# Patient Record
Sex: MALE | Race: WHITE | NOT HISPANIC OR LATINO | Employment: FULL TIME | URBAN - METROPOLITAN AREA
[De-identification: names, ages, dates, MRNs, and addresses within clinical notes are randomized per-mention and may not be internally consistent; named-entity substitution may affect disease eponyms.]

---

## 2024-01-23 ENCOUNTER — OFFICE VISIT (OUTPATIENT)
Dept: INTERNAL MEDICINE | Facility: CLINIC | Age: 59
End: 2024-01-23
Payer: COMMERCIAL

## 2024-01-23 VITALS
HEIGHT: 70 IN | TEMPERATURE: 98 F | BODY MASS INDEX: 39.4 KG/M2 | OXYGEN SATURATION: 98 % | HEART RATE: 79 BPM | WEIGHT: 275.2 LBS | DIASTOLIC BLOOD PRESSURE: 71 MMHG | SYSTOLIC BLOOD PRESSURE: 121 MMHG

## 2024-01-23 DIAGNOSIS — J45.20 MILD INTERMITTENT ASTHMA WITHOUT COMPLICATION: ICD-10-CM

## 2024-01-23 DIAGNOSIS — Z00.00 WELL ADULT EXAM: ICD-10-CM

## 2024-01-23 DIAGNOSIS — I25.10 CORONARY ARTERY DISEASE INVOLVING NATIVE CORONARY ARTERY OF NATIVE HEART WITHOUT ANGINA PECTORIS: ICD-10-CM

## 2024-01-23 DIAGNOSIS — I10 PRIMARY HYPERTENSION: ICD-10-CM

## 2024-01-23 DIAGNOSIS — G89.29 CHRONIC LEFT SHOULDER PAIN: ICD-10-CM

## 2024-01-23 DIAGNOSIS — K21.9 GASTROESOPHAGEAL REFLUX DISEASE WITHOUT ESOPHAGITIS: ICD-10-CM

## 2024-01-23 DIAGNOSIS — M25.512 CHRONIC LEFT SHOULDER PAIN: ICD-10-CM

## 2024-01-23 DIAGNOSIS — E78.2 MIXED HYPERLIPIDEMIA: ICD-10-CM

## 2024-01-23 DIAGNOSIS — Z12.5 PROSTATE CANCER SCREENING: ICD-10-CM

## 2024-01-23 DIAGNOSIS — E11.9 TYPE 2 DIABETES MELLITUS WITHOUT COMPLICATION, WITHOUT LONG-TERM CURRENT USE OF INSULIN: Primary | ICD-10-CM

## 2024-01-23 PROBLEM — E66.9 OBESITY WITH BODY MASS INDEX 30 OR GREATER: Status: ACTIVE | Noted: 2019-08-17

## 2024-01-23 PROBLEM — R17 HIGH TOTAL BILIRUBIN: Status: ACTIVE | Noted: 2019-09-19

## 2024-01-23 PROBLEM — J45.909 ASTHMA: Status: ACTIVE | Noted: 2024-01-23

## 2024-01-23 PROCEDURE — 99204 OFFICE O/P NEW MOD 45 MIN: CPT | Performed by: INTERNAL MEDICINE

## 2024-01-23 RX ORDER — LISINOPRIL 10 MG/1
10 TABLET ORAL DAILY
Qty: 90 TABLET | Refills: 1 | Status: SHIPPED | OUTPATIENT
Start: 2024-01-23

## 2024-01-23 RX ORDER — GLIMEPIRIDE 4 MG/1
4 TABLET ORAL
Qty: 90 TABLET | Refills: 1 | Status: SHIPPED | OUTPATIENT
Start: 2024-01-23

## 2024-01-23 RX ORDER — LANSOPRAZOLE 30 MG/1
30 CAPSULE, DELAYED RELEASE ORAL DAILY
Qty: 90 CAPSULE | Refills: 1 | Status: SHIPPED | OUTPATIENT
Start: 2024-01-23

## 2024-01-23 RX ORDER — GLIMEPIRIDE 4 MG/1
4 TABLET ORAL
Status: SHIPPED | COMMUNITY
Start: 2024-01-23 | End: 2024-01-23 | Stop reason: SDUPTHER

## 2024-01-23 RX ORDER — SILDENAFIL 100 MG/1
1 TABLET, FILM COATED ORAL DAILY PRN
COMMUNITY

## 2024-01-23 RX ORDER — ROSUVASTATIN CALCIUM 40 MG/1
40 TABLET, COATED ORAL DAILY
Qty: 90 TABLET | Refills: 1 | Status: SHIPPED | OUTPATIENT
Start: 2024-01-23

## 2024-01-23 RX ORDER — EMPAGLIFLOZIN 25 MG/1
25 TABLET, FILM COATED ORAL DAILY
Qty: 90 TABLET | Refills: 1 | Status: SHIPPED | OUTPATIENT
Start: 2024-01-23

## 2024-01-23 RX ORDER — DULAGLUTIDE 0.75 MG/.5ML
0.5 INJECTION, SOLUTION SUBCUTANEOUS
COMMUNITY
End: 2024-01-23

## 2024-01-23 RX ORDER — ALBUTEROL SULFATE 90 UG/1
1 AEROSOL, METERED RESPIRATORY (INHALATION)
COMMUNITY

## 2024-01-23 RX ORDER — METOPROLOL SUCCINATE 25 MG/1
25 TABLET, EXTENDED RELEASE ORAL DAILY
Qty: 90 TABLET | Refills: 1 | Status: SHIPPED | OUTPATIENT
Start: 2024-01-23

## 2024-01-23 RX ORDER — MECOBALAMIN 5000 MCG
15 TABLET,DISINTEGRATING ORAL
COMMUNITY
End: 2024-01-23

## 2024-01-23 RX ORDER — ROSUVASTATIN CALCIUM 10 MG/1
1 TABLET, COATED ORAL EVERY EVENING
COMMUNITY
End: 2024-01-23 | Stop reason: SDUPTHER

## 2024-01-23 NOTE — ASSESSMENT & PLAN NOTE
Blood pressure is well-controlled and his pulse is in the upper 70s as of his 1/24 OV.  Patient stable to continue with low doses of lisinopril and metoprolol.

## 2024-01-23 NOTE — PROGRESS NOTES
"Chief Complaint  Establish Care (Pt here to establish care. Pt wants to discuss left shoulder pain)    Subjective      Cisco Acevedo presents to [facility]    History of Present Illness  Patient is a 58-year-old male with underlying DM, HTN, HLD, resultant CAD, morbid obesity, among others, who is being seen in 1/24 as a New Patient.  We will review his med list, go over any recent labs available, address his care gaps, and make further recommendations at that time.    Review of Systems   Constitutional:  Negative for appetite change, fatigue and fever.   HENT:  Negative for congestion and ear pain.    Eyes:  Negative for blurred vision.   Respiratory:  Negative for cough, chest tightness, shortness of breath and wheezing.    Cardiovascular:  Negative for chest pain, palpitations and leg swelling.   Gastrointestinal:  Negative for abdominal pain.   Genitourinary:  Negative for difficulty urinating, dysuria and hematuria.   Musculoskeletal:  Negative for arthralgias and gait problem.   Skin:  Negative for skin lesions.   Neurological:  Negative for syncope, memory problem and confusion.   Psychiatric/Behavioral:  Negative for self-injury and depressed mood.        Objective   Vital Signs:   /71   Pulse 79   Temp 98 °F (36.7 °C)   Ht 177.8 cm (70\")   Wt 125 kg (275 lb 3.2 oz)   SpO2 98%   BMI 39.49 kg/m²         Physical Exam  Vitals and nursing note reviewed.   Constitutional:       General: He is not in acute distress.     Appearance: Normal appearance. He is not toxic-appearing.   HENT:      Head: Atraumatic.      Right Ear: External ear normal.      Left Ear: External ear normal.      Nose: Nose normal.      Mouth/Throat:      Mouth: Mucous membranes are moist.   Eyes:      General:         Right eye: No discharge.         Left eye: No discharge.      Extraocular Movements: Extraocular movements intact.      Pupils: Pupils are equal, round, and reactive to light. "   Neck:      Comments: No carotid bruit.  Cardiovascular:      Rate and Rhythm: Normal rate and regular rhythm.      Pulses: Normal pulses.      Heart sounds: Normal heart sounds. No murmur heard.     No gallop.      Comments: Heart tones normal, no ectopy, no S3.  Pulmonary:      Effort: Pulmonary effort is normal. No respiratory distress.      Breath sounds: No wheezing, rhonchi or rales.      Comments: Lung fields clear bilaterally.  Abdominal:      General: There is no distension.      Palpations: Abdomen is soft. There is no mass.      Tenderness: There is no abdominal tenderness. There is no guarding.      Comments: Positive liver edge.   Musculoskeletal:         General: No swelling or tenderness.      Cervical back: No tenderness.      Right lower leg: No edema.      Left lower leg: No edema.      Comments: Trace pretibial edema.   Skin:     General: Skin is warm and dry.      Findings: No rash.   Neurological:      General: No focal deficit present.      Mental Status: He is alert and oriented to person, place, and time. Mental status is at baseline.      Motor: No weakness.      Gait: Gait normal.   Psychiatric:         Mood and Affect: Mood normal.         Thought Content: Thought content normal.          Result Review   The following data was reviewed by: Bakari Coppola MD on 01/23/2024:  [x] Laboratory  [] Microbiology  [] Radiology  [] EKG/telemetry  [] Cardiology/Vascular  [] Pathology  [x] Old records             Assessment and Plan   Diagnoses and all orders for this visit:    1. Type 2 diabetes mellitus without complication, without long-term current use of insulin (Primary)  Overview:  Patient diagnosed as diabetic at least in his mid 30s, was initially on insulin, and transition to oral agents.    Assessment & Plan:  Last A1c in the chart was 9.1 that was back in early 2023.  Sounds like he had an A1c of about 8.2 since then.    Patient is currently on full doses of metformin and Jardiance.  He is  on moderate dose of glimepiride.    Of note, he failed semaglutide, episode of pancreatitis.    Recommend patient continue current regimen, will get A1c and make further recommendations after that.        Orders:  -     C-Peptide; Future  -     Microalbumin / Creatinine Urine Ratio - Urine, Clean Catch; Future  -     Hemoglobin A1c; Future    2. Mixed hyperlipidemia  -     Lipid Panel; Future    3. Primary hypertension  Assessment & Plan:  Blood pressure is well-controlled and his pulse is in the upper 70s as of his 1/24 OV.  Patient stable to continue with low doses of lisinopril and metoprolol.    Orders:  -     Comprehensive Metabolic Panel; Future  -     Urinalysis With Culture If Indicated -; Future    4. Gastroesophageal reflux disease without esophagitis  Assessment & Plan:  Patient had a early ulcer, but mainly maintained on PPI for reflux presently.  Patient with no dysphagia and no significant dyspepsia on moderate dose of lansoprazole, so stable to continue same.      5. Coronary artery disease involving native coronary artery of native heart without angina pectoris  Overview:  CTA coronaries 7/21:  1. Abnormal coronary arteries within limitations of CT technique.   2. 50-70% stenosis of the proximal RCA and 25-50% stenosis of the LAD.    Uninterpretable stenosis of the OM and mid RCA.   3. Calcification in the ascending aorta.    4. Normal pericardium and cardiac morphology within limitations of CT   technique.     Assessment & Plan:  Patient is clinically stable this regards as of 1/24.  He was hospitalized back in 2021 and given his risk factors, he underwent evaluation for possible angina as noted above.  He had noncritical lesions, recommendation was for conservative treatment at that time.    Patient has been maintained on baby aspirin, beta-blocker, and statin therapy since then.    Will make at least a one-time referral over to cardiology to see if they recommend follow-up CTA of his coronaries or  other imaging studies.  Otherwise patient will continue current plan of care.    Orders:  -     Ambulatory Referral to Cardiology    6. Well adult exam  -     CBC & Differential; Future  -     TSH+Free T4; Future  -     Hepatitis C Antibody; Future    7. Prostate cancer screening  -     PSA Screen; Future  -     Vitamin D,25-Hydroxy; Future    8. Mild intermittent asthma without complication  Assessment & Plan:  This was more of an issue when he was younger, mainly just flareups if he has an acute respiratory illness.  He has an albuterol inhaler available for rescue use only.  Certainly appropriate to continue current plan of care.      9. Chronic left shoulder pain  Assessment & Plan:  This is been going on for least 6 months as of his 1/24 OV.  There was no specific trauma.  The pain is gradually improving, but he does have decreased ROM still.    Patient has decreased ROM on exam.  I think we will make referral to PT first, if not improving as expected, will ask orthopedics to eval.    Orders:  -     Ambulatory Referral to Physical Therapy Evaluate and treat    Other orders  -     metFORMIN (GLUCOPHAGE) 1000 MG tablet; Take 1 tablet by mouth Every 12 (Twelve) Hours.  Dispense: 360 tablet; Refill: 1  -     Jardiance 25 MG tablet tablet; Take 1 tablet by mouth Daily.  Dispense: 90 tablet; Refill: 1  -     glimepiride (AMARYL) 4 MG tablet; Take 1 tablet by mouth Every Morning Before Breakfast.  Dispense: 90 tablet; Refill: 1  -     rosuvastatin (CRESTOR) 40 MG tablet; Take 1 tablet by mouth Daily.  Dispense: 90 tablet; Refill: 1  -     lisinopril (PRINIVIL,ZESTRIL) 10 MG tablet; Take 1 tablet by mouth Daily.  Dispense: 90 tablet; Refill: 1  -     metoprolol succinate XL (TOPROL-XL) 25 MG 24 hr tablet; Take 1 tablet by mouth Daily.  Dispense: 90 tablet; Refill: 1  -     lansoprazole (PREVACID) 30 MG capsule; Take 1 capsule by mouth Daily.  Dispense: 90 capsule; Refill: 1        Class 2 Severe Obesity (BMI >=35 and  <=39.9). Obesity-related health conditions include the following: coronary heart disease, diabetes mellitus, dyslipidemias, and osteoarthritis. Obesity is unchanged. BMI is is above average; BMI management plan is completed. We discussed portion control and increasing exercise.    Follow Up   Return in about 4 weeks (around 2/20/2024).  Patient was given instructions and counseling regarding his condition or for health maintenance advice. Please see specific information pulled into the AVS if appropriate.     Total Time Spent:   minutes     This time includes time spent by me in the following activities: preparing for the visit, reviewing extensive past medical history and tests, performing a medically appropriate examination and/or evaluation, counseling and educating the patient and/or caregivers, ordering medications, tests, or procedures, referring and/or communicating with other health care professionals and documenting information in the medical record all on this date of service.

## 2024-01-23 NOTE — ASSESSMENT & PLAN NOTE
This was more of an issue when he was younger, mainly just flareups if he has an acute respiratory illness.  He has an albuterol inhaler available for rescue use only.  Certainly appropriate to continue current plan of care.

## 2024-01-23 NOTE — ASSESSMENT & PLAN NOTE
This is been going on for least 6 months as of his 1/24 OV.  There was no specific trauma.  The pain is gradually improving, but he does have decreased ROM still.    Patient has decreased ROM on exam.  I think we will make referral to PT first, if not improving as expected, will ask orthopedics to eval.

## 2024-01-23 NOTE — ASSESSMENT & PLAN NOTE
Last A1c in the chart was 9.1 that was back in early 2023.  Sounds like he had an A1c of about 8.2 since then.    Patient is currently on full doses of metformin and Jardiance.  He is on moderate dose of glimepiride.    Of note, he failed semaglutide, episode of pancreatitis.    Recommend patient continue current regimen, will get A1c and make further recommendations after that.

## 2024-01-23 NOTE — ASSESSMENT & PLAN NOTE
Patient had a early ulcer, but mainly maintained on PPI for reflux presently.  Patient with no dysphagia and no significant dyspepsia on moderate dose of lansoprazole, so stable to continue same.

## 2024-01-23 NOTE — ASSESSMENT & PLAN NOTE
Patient is clinically stable this regards as of 1/24.  He was hospitalized back in 2021 and given his risk factors, he underwent evaluation for possible angina as noted above.  He had noncritical lesions, recommendation was for conservative treatment at that time.    Patient has been maintained on baby aspirin, beta-blocker, and statin therapy since then.    Will make at least a one-time referral over to cardiology to see if they recommend follow-up CTA of his coronaries or other imaging studies.  Otherwise patient will continue current plan of care.

## 2024-01-26 ENCOUNTER — PATIENT ROUNDING (BHMG ONLY) (OUTPATIENT)
Dept: INTERNAL MEDICINE | Facility: CLINIC | Age: 59
End: 2024-01-26
Payer: COMMERCIAL

## 2024-03-02 ENCOUNTER — LAB (OUTPATIENT)
Dept: LAB | Facility: HOSPITAL | Age: 59
End: 2024-03-02
Payer: COMMERCIAL

## 2024-03-02 DIAGNOSIS — I10 PRIMARY HYPERTENSION: ICD-10-CM

## 2024-03-02 DIAGNOSIS — Z00.00 WELL ADULT EXAM: ICD-10-CM

## 2024-03-02 DIAGNOSIS — E11.9 TYPE 2 DIABETES MELLITUS WITHOUT COMPLICATION, WITHOUT LONG-TERM CURRENT USE OF INSULIN: ICD-10-CM

## 2024-03-02 DIAGNOSIS — E78.2 MIXED HYPERLIPIDEMIA: ICD-10-CM

## 2024-03-02 DIAGNOSIS — Z12.5 PROSTATE CANCER SCREENING: ICD-10-CM

## 2024-03-02 LAB
25(OH)D3 SERPL-MCNC: 21.9 NG/ML (ref 30–100)
ALBUMIN SERPL-MCNC: 4.3 G/DL (ref 3.5–5.2)
ALBUMIN UR-MCNC: 20.8 MG/DL
ALBUMIN/GLOB SERPL: 1.3 G/DL
ALP SERPL-CCNC: 125 U/L (ref 39–117)
ALT SERPL W P-5'-P-CCNC: 23 U/L (ref 1–41)
ANION GAP SERPL CALCULATED.3IONS-SCNC: 13.7 MMOL/L (ref 5–15)
AST SERPL-CCNC: 20 U/L (ref 1–40)
BACTERIA UR QL AUTO: NORMAL /HPF
BASOPHILS # BLD AUTO: 0.04 10*3/MM3 (ref 0–0.2)
BASOPHILS NFR BLD AUTO: 0.7 % (ref 0–1.5)
BILIRUB SERPL-MCNC: 1.1 MG/DL (ref 0–1.2)
BILIRUB UR QL STRIP: NEGATIVE
BUN SERPL-MCNC: 26 MG/DL (ref 6–20)
BUN/CREAT SERPL: 17.8 (ref 7–25)
CALCIUM SPEC-SCNC: 10 MG/DL (ref 8.6–10.5)
CHLORIDE SERPL-SCNC: 103 MMOL/L (ref 98–107)
CHOLEST SERPL-MCNC: 262 MG/DL (ref 0–200)
CLARITY UR: CLEAR
CO2 SERPL-SCNC: 25.3 MMOL/L (ref 22–29)
COLOR UR: YELLOW
CREAT SERPL-MCNC: 1.46 MG/DL (ref 0.76–1.27)
CREAT UR-MCNC: 35.2 MG/DL
DEPRECATED RDW RBC AUTO: 38.2 FL (ref 37–54)
EGFRCR SERPLBLD CKD-EPI 2021: 55.7 ML/MIN/1.73
EOSINOPHIL # BLD AUTO: 0.15 10*3/MM3 (ref 0–0.4)
EOSINOPHIL NFR BLD AUTO: 2.6 % (ref 0.3–6.2)
ERYTHROCYTE [DISTWIDTH] IN BLOOD BY AUTOMATED COUNT: 12.5 % (ref 12.3–15.4)
GLOBULIN UR ELPH-MCNC: 3.2 GM/DL
GLUCOSE SERPL-MCNC: 313 MG/DL (ref 65–99)
GLUCOSE UR STRIP-MCNC: ABNORMAL MG/DL
HBA1C MFR BLD: 11.9 % (ref 4.8–5.6)
HCT VFR BLD AUTO: 52.7 % (ref 37.5–51)
HCV AB SER DONR QL: NORMAL
HDLC SERPL-MCNC: 47 MG/DL (ref 40–60)
HGB BLD-MCNC: 17.2 G/DL (ref 13–17.7)
HGB UR QL STRIP.AUTO: NEGATIVE
HOLD SPECIMEN: NORMAL
HYALINE CASTS UR QL AUTO: NORMAL /LPF
IMM GRANULOCYTES # BLD AUTO: 0.03 10*3/MM3 (ref 0–0.05)
IMM GRANULOCYTES NFR BLD AUTO: 0.5 % (ref 0–0.5)
KETONES UR QL STRIP: ABNORMAL
LDLC SERPL CALC-MCNC: 168 MG/DL (ref 0–100)
LDLC/HDLC SERPL: 3.51 {RATIO}
LEUKOCYTE ESTERASE UR QL STRIP.AUTO: NEGATIVE
LYMPHOCYTES # BLD AUTO: 1.58 10*3/MM3 (ref 0.7–3.1)
LYMPHOCYTES NFR BLD AUTO: 27.9 % (ref 19.6–45.3)
MCH RBC QN AUTO: 27.8 PG (ref 26.6–33)
MCHC RBC AUTO-ENTMCNC: 32.6 G/DL (ref 31.5–35.7)
MCV RBC AUTO: 85.3 FL (ref 79–97)
MICROALBUMIN/CREAT UR: 590.9 MG/G (ref 0–29)
MONOCYTES # BLD AUTO: 0.48 10*3/MM3 (ref 0.1–0.9)
MONOCYTES NFR BLD AUTO: 8.5 % (ref 5–12)
NEUTROPHILS NFR BLD AUTO: 3.39 10*3/MM3 (ref 1.7–7)
NEUTROPHILS NFR BLD AUTO: 59.8 % (ref 42.7–76)
NITRITE UR QL STRIP: NEGATIVE
NRBC BLD AUTO-RTO: 0 /100 WBC (ref 0–0.2)
PH UR STRIP.AUTO: 6 [PH] (ref 5–8)
PLATELET # BLD AUTO: 214 10*3/MM3 (ref 140–450)
PMV BLD AUTO: 10 FL (ref 6–12)
POTASSIUM SERPL-SCNC: 5.3 MMOL/L (ref 3.5–5.2)
PROT SERPL-MCNC: 7.5 G/DL (ref 6–8.5)
PROT UR QL STRIP: ABNORMAL
PSA SERPL-MCNC: 1.39 NG/ML (ref 0–4)
RBC # BLD AUTO: 6.18 10*6/MM3 (ref 4.14–5.8)
RBC # UR STRIP: NORMAL /HPF
REF LAB TEST METHOD: NORMAL
SODIUM SERPL-SCNC: 142 MMOL/L (ref 136–145)
SP GR UR STRIP: >=1.03 (ref 1–1.03)
SQUAMOUS #/AREA URNS HPF: NORMAL /HPF
T4 FREE SERPL-MCNC: 1.53 NG/DL (ref 0.93–1.7)
TRIGL SERPL-MCNC: 251 MG/DL (ref 0–150)
TSH SERPL DL<=0.05 MIU/L-ACNC: 1.23 UIU/ML (ref 0.27–4.2)
UROBILINOGEN UR QL STRIP: ABNORMAL
VLDLC SERPL-MCNC: 47 MG/DL (ref 5–40)
WBC # UR STRIP: NORMAL /HPF
WBC NRBC COR # BLD AUTO: 5.67 10*3/MM3 (ref 3.4–10.8)

## 2024-03-02 PROCEDURE — 80050 GENERAL HEALTH PANEL: CPT

## 2024-03-02 PROCEDURE — G0103 PSA SCREENING: HCPCS

## 2024-03-02 PROCEDURE — 86803 HEPATITIS C AB TEST: CPT

## 2024-03-02 PROCEDURE — 83036 HEMOGLOBIN GLYCOSYLATED A1C: CPT

## 2024-03-02 PROCEDURE — 82306 VITAMIN D 25 HYDROXY: CPT

## 2024-03-02 PROCEDURE — 84681 ASSAY OF C-PEPTIDE: CPT

## 2024-03-02 PROCEDURE — 80061 LIPID PANEL: CPT

## 2024-03-02 PROCEDURE — 81001 URINALYSIS AUTO W/SCOPE: CPT

## 2024-03-02 PROCEDURE — 84439 ASSAY OF FREE THYROXINE: CPT

## 2024-03-02 PROCEDURE — 82043 UR ALBUMIN QUANTITATIVE: CPT

## 2024-03-02 PROCEDURE — 82570 ASSAY OF URINE CREATININE: CPT

## 2024-03-02 PROCEDURE — 36415 COLL VENOUS BLD VENIPUNCTURE: CPT

## 2024-03-04 ENCOUNTER — TELEPHONE (OUTPATIENT)
Dept: INTERNAL MEDICINE | Age: 59
End: 2024-03-04
Payer: COMMERCIAL

## 2024-03-04 NOTE — TELEPHONE ENCOUNTER
HUB may really to patient      ----- Message from Bakari Coppola MD sent at 3/4/2024 10:29 AM EST -----  Please let patient know there are numerous abnormalities on his labs and we will review them at his appointment this week.  He should read about a low potassium diet and try to start following this in the meantime.  We will give him a handout at this appointment as well.   Spoke with Jovanna, Advised MD will see as soon as they can come. Jovanna states that they can be here between 12:00pm and 1:00pm. Informed we will see here once she gets here.

## 2024-03-05 ENCOUNTER — TELEPHONE (OUTPATIENT)
Dept: INTERNAL MEDICINE | Age: 59
End: 2024-03-05
Payer: COMMERCIAL

## 2024-03-05 LAB — C PEPTIDE SERPL-MCNC: 2.5 NG/ML (ref 1.1–4.4)

## 2024-03-05 NOTE — TELEPHONE ENCOUNTER
HUB may relay message to patient.     ----- Message from Bakari Coppola MD sent at 3/4/2024 10:29 AM EST -----  Please let patient know there are numerous abnormalities on his labs and we will review them at his appointment this week.  He should read about a low potassium diet and try to start following this in the meantime.  We will give him a handout at this appointment as well.

## 2024-03-07 ENCOUNTER — OFFICE VISIT (OUTPATIENT)
Dept: INTERNAL MEDICINE | Facility: CLINIC | Age: 59
End: 2024-03-07
Payer: COMMERCIAL

## 2024-03-07 VITALS
WEIGHT: 276.6 LBS | SYSTOLIC BLOOD PRESSURE: 118 MMHG | HEIGHT: 70 IN | TEMPERATURE: 97.7 F | HEART RATE: 77 BPM | DIASTOLIC BLOOD PRESSURE: 68 MMHG | BODY MASS INDEX: 39.6 KG/M2 | OXYGEN SATURATION: 98 %

## 2024-03-07 DIAGNOSIS — E55.9 VITAMIN D DEFICIENCY: ICD-10-CM

## 2024-03-07 DIAGNOSIS — D75.1 POLYCYTHEMIA: ICD-10-CM

## 2024-03-07 DIAGNOSIS — E78.2 MIXED HYPERLIPIDEMIA: ICD-10-CM

## 2024-03-07 DIAGNOSIS — Z00.00 WELL ADULT EXAM: Primary | ICD-10-CM

## 2024-03-07 DIAGNOSIS — E11.9 TYPE 2 DIABETES MELLITUS WITHOUT COMPLICATION, WITHOUT LONG-TERM CURRENT USE OF INSULIN: ICD-10-CM

## 2024-03-07 DIAGNOSIS — N18.31 STAGE 3A CHRONIC KIDNEY DISEASE: ICD-10-CM

## 2024-03-07 DIAGNOSIS — I25.10 CORONARY ARTERY DISEASE INVOLVING NATIVE CORONARY ARTERY OF NATIVE HEART WITHOUT ANGINA PECTORIS: ICD-10-CM

## 2024-03-07 DIAGNOSIS — I10 PRIMARY HYPERTENSION: ICD-10-CM

## 2024-03-07 RX ORDER — ASPIRIN 81 MG/1
81 TABLET, COATED ORAL DAILY
Qty: 90 TABLET | Refills: 1 | Status: SHIPPED | OUTPATIENT
Start: 2024-03-07

## 2024-03-07 RX ORDER — VALACYCLOVIR HYDROCHLORIDE 500 MG/1
500 TABLET, FILM COATED ORAL AS NEEDED
Qty: 30 TABLET | Refills: 1 | Status: SHIPPED | OUTPATIENT
Start: 2024-03-07

## 2024-03-07 RX ORDER — VALACYCLOVIR HYDROCHLORIDE 500 MG/1
500 TABLET, FILM COATED ORAL AS NEEDED
COMMUNITY
End: 2024-03-07 | Stop reason: SDUPTHER

## 2024-03-07 NOTE — ASSESSMENT & PLAN NOTE
Patient's GFR is 56, we do not have a previous baseline.  His creatinine is fairly similar to previous though, it is 1.4.  He reports being told it has been like this for 20 years, hopefully will stay steady.  Of note his potassium is high normal at 5.3, he is on no potassium supplementation he is on low-dose ACE inhibitor though.  He does have proteinuria, so we need the ACE.  May be secondary to how the blood was handled, will have labs sent to the hospital next time.

## 2024-03-07 NOTE — ASSESSMENT & PLAN NOTE
Patient stable this regards at least as of 3/24.  He is on low-dose baby aspirin and metoprolol for antianginal benefit, should continue same.  We are working on getting him back on his cholesterol to get his LDL down.    Still has not heard from cardiology about the referral, we are looking into this again.

## 2024-03-07 NOTE — ASSESSMENT & PLAN NOTE
LDL is 168 as of his 3/24 OV.  He has full dose Crestor written for, but has been intolerant to this due to cramps/myalgias.  He will get on coenzyme every 10 give this some time to build up, and then see if he can get back on Crestor.  Discussed with patient should at least try to take it every other day.

## 2024-03-07 NOTE — ASSESSMENT & PLAN NOTE
This is mild, hemoglobin is around 17 hematocrit was little bit elevated as well.  We did not get iron studies, will check those on return to office.  He is a non-smoker.

## 2024-03-07 NOTE — PATIENT INSTRUCTIONS
1.  Look for vitamin D3 over-the-counter, you need to take 2000 international units once daily.    2.  Also look for coenzyme Q10.  You should take 200 mg once a day.  Get on this, take it for 3 weeks or so, then get started back on your cholesterol medication.    3.  Until we see follow-up labs, may be look at some of the potassium content of the food as noted below.    4.  We will be sending over supplies for testing, and as we said, we want to eventually get your morning sugars into the 150 ballpark.  You can titrate the insulin as needed by 2 to 4 units every several days.  Call if we need to change the prescription.        Potassium Content of Foods    Potassium is a mineral found in many foods and drinks. It can affect how the heart works, affect blood pressure, and keep fluids and electrolytes balanced in the body. It is important not to have too much potassium (hyperkalemia) or too little potassium (hypokalemia) in the body, especially in the blood.  Potassium is naturally found in many different types of whole foods, such as fruits, vegetables, meat, and dairy products. Processed foods tend to be lower in potassium. The amount of potassium you need each day depends on your age and any medical conditions you may have. General recommendations are:  Females aged 19 and older: 2,600 mg per day.  Males aged 19 and older: 3,400 mg per day.  Talk with your health care provider or dietitian about how much potassium you need.  What foods are high in potassium?  Below are examples of foods that have greater than 200 mg of potassium per serving.  Fruits  Orange -- 1 medium (130 g) has 230 mg of potassium.  Banana -- 1 medium (120 g) has 420 mg of potassium.  Cantaloupe, chunks -- 1 cup (160 g) has 430 mg of potassium.  Vegetables  Potato, baked, without skin -- 1 medium (170 g) has 600 mg of potassium.  Broccoli, chopped, cooked -- ½ cup (77.5 g) has 230 mg of potassium.  Tomato, chopped or sliced -- 1 cup (152 g) has  400 mg of potassium.  Grains  Cereal, bran with raisins -- 1 cup (59 g) has 360 mg of potassium.  Granola with almonds -- ½ cup (82 g) has 220 mg of potassium.  Meats and other proteins  Ground beef guillermo -- 4 ounces (113 g) has 240 mg of potassium.  Kidney beans, boiled -- ½ cup (130 g) has 350 mg of potassium.  Almonds -- 1 ounce (approximately 22 nuts or 28 g) has 200 mg of potassium.  Dairy  Cow's milk, 1% -- 1 cup (237 mL) has 360 mg of potassium.  Plain vanilla low-fat yogurt -- ¾ cup (184 g) has 220 mg of potassium.  The items listed above may not be a complete list of foods high in potassium. Actual amounts of potassium may be different depending on ripeness, shelf life, and food preparation. Contact a dietitian for more information.  What foods are low in potassium?  Below are examples of foods that have less than 200 mg of potassium per serving.  Fruits  Blueberries -- 1 cup (145 g) has 110 mg of potassium.  Apple -- 1 medium (140 g) has 145 mg of potassium.  Grapes -- 1 cup (160 g) has 175 mg of potassium.  Vegetables  Cabbage, raw -- 1 cup (70 g) has 120 mg of potassium.  Cauliflower, chopped, cooked -- 1 cup (180 g) has 90 mg of potassium.  Jose lettuce, chopped -- 1 cup (56 g) has 120 mg of potassium.  Grains  Bagel, plain -- one 4-inch (10 cm) has 100 mg of potassium.  Whole wheat bread -- 1 slice (26 g) has 70 mg of potassium.  White rice, cooked -- 1 cup (163 g) has 50 mg of potassium.  Meats and other proteins  Tuna, light, canned in water -- 3 ounces (85 g) has 150 mg of potassium.  Egg, fried -- 1 large (50 g) has 60 mg of potassium.  Peanuts --1 ounce (35 nuts or 28 g) has 180 mg of potassium.  Tofu -- ½ cup (252 g) has 150 mg of potassium.  Dairy  Cheese (cheddar, jarrell, mozzarella, or provolone) -- 1 ounce (28 g) has 30 to 40 mg of potassium.  The items listed above may not be a complete list of foods that are low in potassium. Actual amounts of potassium may be different depending on  ripeness, shelf life, and food preparation. Contact a dietitian for more information.  Summary  Potassium is a mineral found in many foods and drinks. It affects how the heart works, affects blood pressure, and keeps fluids and electrolytes balanced in the body.  The amount of potassium you need each day depends on your age and any existing medical conditions you may have.  Your health care provider or dietitian may recommend an amount of potassium that you should have each day.  This information is not intended to replace advice given to you by your health care provider. Make sure you discuss any questions you have with your health care provider.  Document Revised: 09/20/2022 Document Reviewed: 09/01/2022  Elsevier Patient Education © 2023 [employer]

## 2024-03-07 NOTE — ASSESSMENT & PLAN NOTE
Blood pressure joellen well-controlled as of his 3/24 office visit.  He can continue with just low doses of lisinopril and metoprolol.

## 2024-03-07 NOTE — ASSESSMENT & PLAN NOTE
A1c is up from 9.1 last year to 11.9 as of his 3/24 OV.  Patient has not been watching his diet, he is put on 30 pounds over the past year or so.  His fasting sugar was 300, there is no acidosis.    Patient is maxed out on metformin and Jardiance.  He is just on moderate dose glimepiride, we will go ahead and maxed that out at this time and check C-peptide on return to office.  Additionally we will get patient started on 20 units of Lantus, may transition to Januvia on return to office if patient allowed to take this given episode of pancreatitis noted above.

## 2024-03-07 NOTE — PROGRESS NOTES
"Chief Complaint  Follow-up (4wk follow up/Labs recently completed//No questions / concerns voiced at this time) and Med Refill ( - Aspirin/ - valacyclovir/Send to [facility] [place])    Subjective      Cisco Acevedo presents to [facility]    History of Present Illness  Patient is a 58-year-old male with underlying DM, HTN, HLD, resultant CAD, morbid obesity, among others, seen in 1/24 as a New Patient, and who is coming back now 3/24 for initial lab follow-up.  We will review his med list, go over any recent labs available, address his care gaps, and make further recommendations at that time.    Review of Systems   Constitutional:  Negative for appetite change, fatigue and fever.   HENT:  Negative for congestion and ear pain.    Eyes:  Negative for blurred vision.   Respiratory:  Negative for cough, chest tightness, shortness of breath and wheezing.    Cardiovascular:  Negative for chest pain, palpitations and leg swelling.   Gastrointestinal:  Negative for abdominal pain.   Genitourinary:  Negative for difficulty urinating, dysuria and hematuria.   Musculoskeletal:  Negative for arthralgias and gait problem.   Skin:  Negative for skin lesions.   Neurological:  Negative for syncope, memory problem and confusion.   Psychiatric/Behavioral:  Negative for self-injury and depressed mood.        Objective   Vital Signs:   /68 (BP Location: Right arm, Patient Position: Sitting)   Pulse 77   Temp 97.7 °F (36.5 °C) (Temporal)   Ht 177.8 cm (70\")   Wt 125 kg (276 lb 9.6 oz)   SpO2 98%   BMI 39.69 kg/m²         Physical Exam  Vitals and nursing note reviewed.   Constitutional:       General: He is not in acute distress.     Appearance: Normal appearance. He is not toxic-appearing.   HENT:      Head: Atraumatic.      Right Ear: External ear normal.      Left Ear: External ear normal.      Nose: Nose normal.      Mouth/Throat:      Mouth: Mucous membranes are moist.   Eyes: "      General:         Right eye: No discharge.         Left eye: No discharge.      Extraocular Movements: Extraocular movements intact.      Pupils: Pupils are equal, round, and reactive to light.   Neck:      Comments: No carotid bruit.  Cardiovascular:      Rate and Rhythm: Normal rate and regular rhythm.      Pulses: Normal pulses.      Heart sounds: Normal heart sounds. No murmur heard.     No gallop.      Comments: Heart tones normal, no ectopy, no S3.  Pulmonary:      Effort: Pulmonary effort is normal. No respiratory distress.      Breath sounds: No wheezing, rhonchi or rales.      Comments: Lung fields clear bilaterally.  Abdominal:      General: There is no distension.      Palpations: Abdomen is soft. There is no mass.      Tenderness: There is no abdominal tenderness. There is no guarding.      Comments: Positive liver edge.   Musculoskeletal:         General: No swelling or tenderness.      Cervical back: No tenderness.      Right lower leg: No edema.      Left lower leg: No edema.      Comments: Trace pretibial edema.   Skin:     General: Skin is warm and dry.      Findings: No rash.   Neurological:      General: No focal deficit present.      Mental Status: He is alert and oriented to person, place, and time. Mental status is at baseline.      Motor: No weakness.      Gait: Gait normal.   Psychiatric:         Mood and Affect: Mood normal.         Thought Content: Thought content normal.          Result Review   The following data was reviewed by: Bakari Coppola MD on 01/23/2024:  [x] Laboratory  [] Microbiology  [] Radiology  [] EKG/telemetry  [] Cardiology/Vascular  [] Pathology  [x] Old records             Assessment and Plan   Diagnoses and all orders for this visit:    1. Well adult exam (Primary)  Overview:  Preventive measures: were reviewed with the patient at this office visit.  They included but were not limited to discussions in regards to vaccines outstanding, auto safety with seat belts and  other assistive devices, fall prevention, and routine screening studies.    Exercise: No ischemic symptoms with routine daily activity.  Comprehensive labs: Reviewing all from 3/24.    Covid vaccine: Will get next year.  Other vaccines: Will get next year.    PSA: 1.4 (3/2/2024) (no known prostate cancer)  Colon: Long history of polyps, getting scopes every 5 years.    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  FH: adopted      2. Primary hypertension  Assessment & Plan:  Blood pressure joellen well-controlled as of his 3/24 office visit.  He can continue with just low doses of lisinopril and metoprolol.      3. Type 2 diabetes mellitus without complication, without long-term current use of insulin  Overview:  Patient diagnosed as diabetic at least in his mid 30s, was initially on insulin, and transition to oral agents.    Patient failed semaglutide secondary to episode of pancreatitis.    Assessment & Plan:  A1c is up from 9.1 last year to 11.9 as of his 3/24 OV.  Patient has not been watching his diet, he is put on 30 pounds over the past year or so.  His fasting sugar was 300, there is no acidosis.    Patient is maxed out on metformin and Jardiance.  He is just on moderate dose glimepiride, we will go ahead and maxed that out at this time and check C-peptide on return to office.  Additionally we will get patient started on 20 units of Lantus, may transition to Januvia on return to office if patient allowed to take this given episode of pancreatitis noted above.    Orders:  -     Hemoglobin A1c; Future  -     C-Peptide; Future    4. Mixed hyperlipidemia  Assessment & Plan:  LDL is 168 as of his 3/24 OV.  He has full dose Crestor written for, but has been intolerant to this due to cramps/myalgias.  He will get on coenzyme every 10 give this some time to build up, and then see if he can get back on Crestor.  Discussed with patient should at least  try to take it every other day.    Orders:  -     Lipid Panel; Future    5. Polycythemia  Assessment & Plan:  This is mild, hemoglobin is around 17 hematocrit was little bit elevated as well.  We did not get iron studies, will check those on return to office.  He is a non-smoker.    Orders:  -     CBC & Differential; Future  -     Iron Profile; Future  -     Ferritin; Future    6. Vitamin D deficiency  Assessment & Plan:  Vitamin D is low at 22 as of 3/24.  Recommend he get on 2000 IUs OTC.      7. Coronary artery disease involving native coronary artery of native heart without angina pectoris  Overview:  CTA coronaries 7/21:  1. Abnormal coronary arteries within limitations of CT technique.   2. 50-70% stenosis of the proximal RCA and 25-50% stenosis of the LAD.    Uninterpretable stenosis of the OM and mid RCA.   3. Calcification in the ascending aorta.    4. Normal pericardium and cardiac morphology within limitations of CT   technique.     Assessment & Plan:  Patient stable this regards at least as of 3/24.  He is on low-dose baby aspirin and metoprolol for antianginal benefit, should continue same.  We are working on getting him back on his cholesterol to get his LDL down.    Still has not heard from cardiology about the referral, we are looking into this again.      8. Stage 3a chronic kidney disease  Assessment & Plan:  Patient's GFR is 56, we do not have a previous baseline.  His creatinine is fairly similar to previous though, it is 1.4.  He reports being told it has been like this for 20 years, hopefully will stay steady.  Of note his potassium is high normal at 5.3, he is on no potassium supplementation he is on low-dose ACE inhibitor though.  He does have proteinuria, so we need the ACE.  May be secondary to how the blood was handled, will have labs sent to the hospital next time.    Orders:  -     Comprehensive Metabolic Panel; Future    Other orders  -     Aspirin Low Dose 81 MG EC tablet; Take 1  tablet by mouth Daily.  Dispense: 90 tablet; Refill: 1  -     valACYclovir (VALTREX) 500 MG tablet; Take 1 tablet by mouth As Needed (frequent eye infections).  Dispense: 30 tablet; Refill: 1  -     Insulin Glargine (LANTUS SOLOSTAR) 100 UNIT/ML injection pen; Inject 20 Units under the skin into the appropriate area as directed Every Night for 180 days.  Dispense: 18 mL; Refill: 1          Class 2 Severe Obesity (BMI >=35 and <=39.9). Obesity-related health conditions include the following: coronary heart disease, diabetes mellitus, dyslipidemias, and osteoarthritis. Obesity is unchanged. BMI is is above average; BMI management plan is completed. We discussed portion control and increasing exercise.    Follow Up   Return in about 3 months (around 6/7/2024).  Patient was given instructions and counseling regarding his condition or for health maintenance advice. Please see specific information pulled into the AVS if appropriate.     Total Time Spent:   minutes     This time includes time spent by me in the following activities: preparing for the visit, reviewing extensive past medical history and tests, performing a medically appropriate examination and/or evaluation, counseling and educating the patient and/or caregivers, ordering medications, tests, or procedures, referring and/or communicating with other health care professionals and documenting information in the medical record all on this date of service.

## 2024-03-11 RX ORDER — LANCETS 30 GAUGE
1 EACH MISCELLANEOUS DAILY
Qty: 100 EACH | Refills: 1 | Status: SHIPPED | OUTPATIENT
Start: 2024-03-11

## 2024-03-11 RX ORDER — BLOOD-GLUCOSE METER
1 KIT MISCELLANEOUS DAILY
Qty: 1 EACH | Refills: 0 | Status: SHIPPED | OUTPATIENT
Start: 2024-03-11

## 2024-03-11 RX ORDER — PEN NEEDLE, DIABETIC 31 G X1/4"
1 NEEDLE, DISPOSABLE MISCELLANEOUS DAILY
Qty: 100 EACH | Refills: 1 | Status: SHIPPED | OUTPATIENT
Start: 2024-03-11

## 2024-03-17 ENCOUNTER — HOSPITAL ENCOUNTER (OUTPATIENT)
Facility: HOSPITAL | Age: 59
Setting detail: OBSERVATION
Discharge: HOME | End: 2024-03-18
Attending: EMERGENCY MEDICINE | Admitting: STUDENT IN AN ORGANIZED HEALTH CARE EDUCATION/TRAINING PROGRAM
Payer: COMMERCIAL

## 2024-03-17 ENCOUNTER — APPOINTMENT (OUTPATIENT)
Dept: GENERAL RADIOLOGY | Facility: HOSPITAL | Age: 59
End: 2024-03-17
Payer: COMMERCIAL

## 2024-03-17 ENCOUNTER — APPOINTMENT (OUTPATIENT)
Dept: CARDIOLOGY | Facility: HOSPITAL | Age: 59
End: 2024-03-17
Payer: COMMERCIAL

## 2024-03-17 DIAGNOSIS — R07.89 CHEST DISCOMFORT: Primary | ICD-10-CM

## 2024-03-17 PROBLEM — I25.709 CORONARY ARTERY DISEASE INVOLVING CORONARY BYPASS GRAFT OF NATIVE HEART WITH ANGINA PECTORIS: Status: ACTIVE | Noted: 2021-07-28

## 2024-03-17 PROBLEM — R07.9 CHEST PAIN: Status: ACTIVE | Noted: 2024-03-17

## 2024-03-17 LAB
ALBUMIN SERPL-MCNC: 3.4 G/DL (ref 3.5–5.2)
ALBUMIN SERPL-MCNC: 3.6 G/DL (ref 3.5–5.2)
ALBUMIN/GLOB SERPL: 1.2 G/DL
ALBUMIN/GLOB SERPL: 1.4 G/DL
ALP SERPL-CCNC: 124 U/L (ref 39–117)
ALP SERPL-CCNC: 97 U/L (ref 39–117)
ALT SERPL W P-5'-P-CCNC: 22 U/L (ref 1–41)
ALT SERPL W P-5'-P-CCNC: 23 U/L (ref 1–41)
ANION GAP SERPL CALCULATED.3IONS-SCNC: 10.4 MMOL/L (ref 5–15)
ANION GAP SERPL CALCULATED.3IONS-SCNC: 12.1 MMOL/L (ref 5–15)
AST SERPL-CCNC: 20 U/L (ref 1–40)
AST SERPL-CCNC: 22 U/L (ref 1–40)
BASOPHILS # BLD AUTO: 0.07 10*3/MM3 (ref 0–0.2)
BASOPHILS # BLD AUTO: 0.08 10*3/MM3 (ref 0–0.2)
BASOPHILS NFR BLD AUTO: 1.1 % (ref 0–1.5)
BASOPHILS NFR BLD AUTO: 1.5 % (ref 0–1.5)
BH CV ECHO MEAS - AO ROOT DIAM: 3.7 CM
BH CV ECHO MEAS - EDV(CUBED): 129.6 ML
BH CV ECHO MEAS - EDV(MOD-SP2): 76.3 ML
BH CV ECHO MEAS - EDV(MOD-SP4): 71.8 ML
BH CV ECHO MEAS - EF(MOD-BP): 56.2 %
BH CV ECHO MEAS - EF(MOD-SP2): 53.2 %
BH CV ECHO MEAS - EF(MOD-SP4): 57.8 %
BH CV ECHO MEAS - ESV(CUBED): 25.2 ML
BH CV ECHO MEAS - ESV(MOD-SP2): 35.7 ML
BH CV ECHO MEAS - ESV(MOD-SP4): 30.3 ML
BH CV ECHO MEAS - FS: 42.1 %
BH CV ECHO MEAS - IVS/LVPW: 1.13 CM
BH CV ECHO MEAS - IVSD: 1.01 CM
BH CV ECHO MEAS - LA DIMENSION: 3.9 CM
BH CV ECHO MEAS - LAT PEAK E' VEL: 12.1 CM/SEC
BH CV ECHO MEAS - LV DIASTOLIC VOL/BSA (35-75): 30.1 CM2
BH CV ECHO MEAS - LV MASS(C)D: 174 GRAMS
BH CV ECHO MEAS - LV SYSTOLIC VOL/BSA (12-30): 12.7 CM2
BH CV ECHO MEAS - LVIDD: 5.1 CM
BH CV ECHO MEAS - LVIDS: 2.9 CM
BH CV ECHO MEAS - LVOT AREA: 3.1 CM2
BH CV ECHO MEAS - LVOT DIAM: 2 CM
BH CV ECHO MEAS - LVPWD: 0.9 CM
BH CV ECHO MEAS - MED PEAK E' VEL: 8.5 CM/SEC
BH CV ECHO MEAS - MV A MAX VEL: 62.1 CM/SEC
BH CV ECHO MEAS - MV DEC SLOPE: 545 CM/SEC2
BH CV ECHO MEAS - MV DEC TIME: 0.17 SEC
BH CV ECHO MEAS - MV E MAX VEL: 93.8 CM/SEC
BH CV ECHO MEAS - MV E/A: 1.51
BH CV ECHO MEAS - RVDD: 3 CM
BH CV ECHO MEAS - SI(MOD-SP2): 17 ML/M2
BH CV ECHO MEAS - SI(MOD-SP4): 17.4 ML/M2
BH CV ECHO MEAS - SV(MOD-SP2): 40.6 ML
BH CV ECHO MEAS - SV(MOD-SP4): 41.5 ML
BH CV ECHO MEAS - TAPSE (>1.6): 2.09 CM
BH CV ECHO MEASUREMENTS AVERAGE E/E' RATIO: 9.11
BILIRUB SERPL-MCNC: 0.9 MG/DL (ref 0–1.2)
BILIRUB SERPL-MCNC: 1.1 MG/DL (ref 0–1.2)
BUN SERPL-MCNC: 28 MG/DL (ref 6–20)
BUN SERPL-MCNC: 30 MG/DL (ref 6–20)
BUN/CREAT SERPL: 21.1 (ref 7–25)
BUN/CREAT SERPL: 21.1 (ref 7–25)
CALCIUM SPEC-SCNC: 8.4 MG/DL (ref 8.6–10.5)
CALCIUM SPEC-SCNC: 9.1 MG/DL (ref 8.6–10.5)
CHLORIDE SERPL-SCNC: 103 MMOL/L (ref 98–107)
CHLORIDE SERPL-SCNC: 105 MMOL/L (ref 98–107)
CHOLEST SERPL-MCNC: 103 MG/DL (ref 0–200)
CO2 SERPL-SCNC: 20.9 MMOL/L (ref 22–29)
CO2 SERPL-SCNC: 22.6 MMOL/L (ref 22–29)
CREAT SERPL-MCNC: 1.33 MG/DL (ref 0.76–1.27)
CREAT SERPL-MCNC: 1.42 MG/DL (ref 0.76–1.27)
DEPRECATED RDW RBC AUTO: 38.4 FL (ref 37–54)
DEPRECATED RDW RBC AUTO: 38.8 FL (ref 37–54)
EGFRCR SERPLBLD CKD-EPI 2021: 57.6 ML/MIN/1.73
EGFRCR SERPLBLD CKD-EPI 2021: 62.3 ML/MIN/1.73
EOSINOPHIL # BLD AUTO: 0.2 10*3/MM3 (ref 0–0.4)
EOSINOPHIL # BLD AUTO: 0.21 10*3/MM3 (ref 0–0.4)
EOSINOPHIL NFR BLD AUTO: 3.1 % (ref 0.3–6.2)
EOSINOPHIL NFR BLD AUTO: 3.8 % (ref 0.3–6.2)
ERYTHROCYTE [DISTWIDTH] IN BLOOD BY AUTOMATED COUNT: 12.1 % (ref 12.3–15.4)
ERYTHROCYTE [DISTWIDTH] IN BLOOD BY AUTOMATED COUNT: 12.1 % (ref 12.3–15.4)
GEN 5 2HR TROPONIN T REFLEX: 21 NG/L
GLOBULIN UR ELPH-MCNC: 2.4 GM/DL
GLOBULIN UR ELPH-MCNC: 3 GM/DL
GLUCOSE BLDC GLUCOMTR-MCNC: 132 MG/DL (ref 70–99)
GLUCOSE BLDC GLUCOMTR-MCNC: 184 MG/DL (ref 70–99)
GLUCOSE BLDC GLUCOMTR-MCNC: 197 MG/DL (ref 70–99)
GLUCOSE BLDC GLUCOMTR-MCNC: 204 MG/DL (ref 70–99)
GLUCOSE BLDC GLUCOMTR-MCNC: 242 MG/DL (ref 70–99)
GLUCOSE SERPL-MCNC: 286 MG/DL (ref 65–99)
GLUCOSE SERPL-MCNC: 291 MG/DL (ref 65–99)
HBA1C MFR BLD: 11.8 % (ref 4.8–5.6)
HCT VFR BLD AUTO: 43.9 % (ref 37.5–51)
HCT VFR BLD AUTO: 46.9 % (ref 37.5–51)
HDLC SERPL-MCNC: 36 MG/DL (ref 40–60)
HGB BLD-MCNC: 14.1 G/DL (ref 13–17.7)
HGB BLD-MCNC: 15.3 G/DL (ref 13–17.7)
HOLD SPECIMEN: NORMAL
HOLD SPECIMEN: NORMAL
IMM GRANULOCYTES # BLD AUTO: 0.03 10*3/MM3 (ref 0–0.05)
IMM GRANULOCYTES # BLD AUTO: 0.04 10*3/MM3 (ref 0–0.05)
IMM GRANULOCYTES NFR BLD AUTO: 0.5 % (ref 0–0.5)
IMM GRANULOCYTES NFR BLD AUTO: 0.7 % (ref 0–0.5)
LDLC SERPL CALC-MCNC: 41 MG/DL (ref 0–100)
LDLC/HDLC SERPL: 0.99 {RATIO}
LEFT ATRIUM VOLUME INDEX: 21.7 ML/M2
LIPASE SERPL-CCNC: 46 U/L (ref 13–60)
LYMPHOCYTES # BLD AUTO: 2.15 10*3/MM3 (ref 0.7–3.1)
LYMPHOCYTES # BLD AUTO: 2.49 10*3/MM3 (ref 0.7–3.1)
LYMPHOCYTES NFR BLD AUTO: 38.9 % (ref 19.6–45.3)
LYMPHOCYTES NFR BLD AUTO: 39.4 % (ref 19.6–45.3)
MAGNESIUM SERPL-MCNC: 2 MG/DL (ref 1.6–2.6)
MAGNESIUM SERPL-MCNC: 2.4 MG/DL (ref 1.6–2.6)
MCH RBC QN AUTO: 28.2 PG (ref 26.6–33)
MCH RBC QN AUTO: 28.3 PG (ref 26.6–33)
MCHC RBC AUTO-ENTMCNC: 32.1 G/DL (ref 31.5–35.7)
MCHC RBC AUTO-ENTMCNC: 32.6 G/DL (ref 31.5–35.7)
MCV RBC AUTO: 86.7 FL (ref 79–97)
MCV RBC AUTO: 87.8 FL (ref 79–97)
MONOCYTES # BLD AUTO: 0.54 10*3/MM3 (ref 0.1–0.9)
MONOCYTES # BLD AUTO: 0.65 10*3/MM3 (ref 0.1–0.9)
MONOCYTES NFR BLD AUTO: 10.2 % (ref 5–12)
MONOCYTES NFR BLD AUTO: 9.9 % (ref 5–12)
NEUTROPHILS NFR BLD AUTO: 2.44 10*3/MM3 (ref 1.7–7)
NEUTROPHILS NFR BLD AUTO: 2.96 10*3/MM3 (ref 1.7–7)
NEUTROPHILS NFR BLD AUTO: 44.7 % (ref 42.7–76)
NEUTROPHILS NFR BLD AUTO: 46.2 % (ref 42.7–76)
NRBC BLD AUTO-RTO: 0 /100 WBC (ref 0–0.2)
NRBC BLD AUTO-RTO: 0 /100 WBC (ref 0–0.2)
NT-PROBNP SERPL-MCNC: 148.2 PG/ML (ref 0–900)
PHOSPHATE SERPL-MCNC: 4.4 MG/DL (ref 2.5–4.5)
PLATELET # BLD AUTO: 164 10*3/MM3 (ref 140–450)
PLATELET # BLD AUTO: 198 10*3/MM3 (ref 140–450)
PMV BLD AUTO: 9.3 FL (ref 6–12)
PMV BLD AUTO: 9.3 FL (ref 6–12)
POTASSIUM SERPL-SCNC: 4.2 MMOL/L (ref 3.5–5.2)
POTASSIUM SERPL-SCNC: 4.7 MMOL/L (ref 3.5–5.2)
PROT SERPL-MCNC: 5.8 G/DL (ref 6–8.5)
PROT SERPL-MCNC: 6.6 G/DL (ref 6–8.5)
QT INTERVAL: 406 MS
QT INTERVAL: 406 MS
QTC INTERVAL: 421 MS
QTC INTERVAL: 430 MS
RBC # BLD AUTO: 5 10*6/MM3 (ref 4.14–5.8)
RBC # BLD AUTO: 5.41 10*6/MM3 (ref 4.14–5.8)
SODIUM SERPL-SCNC: 136 MMOL/L (ref 136–145)
SODIUM SERPL-SCNC: 138 MMOL/L (ref 136–145)
TRIGL SERPL-MCNC: 156 MG/DL (ref 0–150)
TROPONIN T DELTA: -1 NG/L
TROPONIN T SERPL HS-MCNC: 22 NG/L
TROPONIN T SERPL HS-MCNC: 23 NG/L
VLDLC SERPL-MCNC: 26 MG/DL (ref 5–40)
WBC NRBC COR # BLD AUTO: 5.46 10*3/MM3 (ref 3.4–10.8)
WBC NRBC COR # BLD AUTO: 6.4 10*3/MM3 (ref 3.4–10.8)
WHOLE BLOOD HOLD COAG: NORMAL
WHOLE BLOOD HOLD SPECIMEN: NORMAL

## 2024-03-17 PROCEDURE — 96372 THER/PROPH/DIAG INJ SC/IM: CPT

## 2024-03-17 PROCEDURE — 93306 TTE W/DOPPLER COMPLETE: CPT | Performed by: INTERNAL MEDICINE

## 2024-03-17 PROCEDURE — 96374 THER/PROPH/DIAG INJ IV PUSH: CPT

## 2024-03-17 PROCEDURE — 99222 1ST HOSP IP/OBS MODERATE 55: CPT | Performed by: STUDENT IN AN ORGANIZED HEALTH CARE EDUCATION/TRAINING PROGRAM

## 2024-03-17 PROCEDURE — 25010000002 KETOROLAC TROMETHAMINE PER 15 MG: Performed by: EMERGENCY MEDICINE

## 2024-03-17 PROCEDURE — 80061 LIPID PANEL: CPT | Performed by: STUDENT IN AN ORGANIZED HEALTH CARE EDUCATION/TRAINING PROGRAM

## 2024-03-17 PROCEDURE — G0378 HOSPITAL OBSERVATION PER HR: HCPCS

## 2024-03-17 PROCEDURE — 93005 ELECTROCARDIOGRAM TRACING: CPT

## 2024-03-17 PROCEDURE — 83735 ASSAY OF MAGNESIUM: CPT | Performed by: STUDENT IN AN ORGANIZED HEALTH CARE EDUCATION/TRAINING PROGRAM

## 2024-03-17 PROCEDURE — 83690 ASSAY OF LIPASE: CPT

## 2024-03-17 PROCEDURE — 85025 COMPLETE CBC W/AUTO DIFF WBC: CPT

## 2024-03-17 PROCEDURE — 83036 HEMOGLOBIN GLYCOSYLATED A1C: CPT | Performed by: STUDENT IN AN ORGANIZED HEALTH CARE EDUCATION/TRAINING PROGRAM

## 2024-03-17 PROCEDURE — 84100 ASSAY OF PHOSPHORUS: CPT | Performed by: STUDENT IN AN ORGANIZED HEALTH CARE EDUCATION/TRAINING PROGRAM

## 2024-03-17 PROCEDURE — 99285 EMERGENCY DEPT VISIT HI MDM: CPT

## 2024-03-17 PROCEDURE — 83880 ASSAY OF NATRIURETIC PEPTIDE: CPT

## 2024-03-17 PROCEDURE — 93005 ELECTROCARDIOGRAM TRACING: CPT | Performed by: EMERGENCY MEDICINE

## 2024-03-17 PROCEDURE — 83735 ASSAY OF MAGNESIUM: CPT

## 2024-03-17 PROCEDURE — 80053 COMPREHEN METABOLIC PANEL: CPT

## 2024-03-17 PROCEDURE — 84484 ASSAY OF TROPONIN QUANT: CPT | Performed by: INTERNAL MEDICINE

## 2024-03-17 PROCEDURE — 84484 ASSAY OF TROPONIN QUANT: CPT | Performed by: EMERGENCY MEDICINE

## 2024-03-17 PROCEDURE — 25010000002 ENOXAPARIN PER 10 MG: Performed by: EMERGENCY MEDICINE

## 2024-03-17 PROCEDURE — 84484 ASSAY OF TROPONIN QUANT: CPT

## 2024-03-17 PROCEDURE — 63710000001 INSULIN REGULAR HUMAN PER 5 UNITS: Performed by: STUDENT IN AN ORGANIZED HEALTH CARE EDUCATION/TRAINING PROGRAM

## 2024-03-17 PROCEDURE — 93306 TTE W/DOPPLER COMPLETE: CPT

## 2024-03-17 PROCEDURE — 82948 REAGENT STRIP/BLOOD GLUCOSE: CPT | Performed by: STUDENT IN AN ORGANIZED HEALTH CARE EDUCATION/TRAINING PROGRAM

## 2024-03-17 PROCEDURE — 82948 REAGENT STRIP/BLOOD GLUCOSE: CPT

## 2024-03-17 PROCEDURE — 36415 COLL VENOUS BLD VENIPUNCTURE: CPT

## 2024-03-17 PROCEDURE — 80053 COMPREHEN METABOLIC PANEL: CPT | Performed by: STUDENT IN AN ORGANIZED HEALTH CARE EDUCATION/TRAINING PROGRAM

## 2024-03-17 PROCEDURE — 85025 COMPLETE CBC W/AUTO DIFF WBC: CPT | Performed by: STUDENT IN AN ORGANIZED HEALTH CARE EDUCATION/TRAINING PROGRAM

## 2024-03-17 PROCEDURE — 71045 X-RAY EXAM CHEST 1 VIEW: CPT

## 2024-03-17 RX ORDER — KETOROLAC TROMETHAMINE 30 MG/ML
30 INJECTION, SOLUTION INTRAMUSCULAR; INTRAVENOUS ONCE
Status: COMPLETED | OUTPATIENT
Start: 2024-03-17 | End: 2024-03-17

## 2024-03-17 RX ORDER — DEXTROSE MONOHYDRATE 25 G/50ML
25 INJECTION, SOLUTION INTRAVENOUS
Status: DISCONTINUED | OUTPATIENT
Start: 2024-03-17 | End: 2024-03-18 | Stop reason: HOSPADM

## 2024-03-17 RX ORDER — IBUPROFEN 600 MG/1
1 TABLET ORAL
Status: DISCONTINUED | OUTPATIENT
Start: 2024-03-17 | End: 2024-03-18 | Stop reason: HOSPADM

## 2024-03-17 RX ORDER — NITROGLYCERIN 0.4 MG/1
0.4 TABLET SUBLINGUAL
Status: DISCONTINUED | OUTPATIENT
Start: 2024-03-17 | End: 2024-03-18 | Stop reason: HOSPADM

## 2024-03-17 RX ORDER — NICOTINE POLACRILEX 4 MG
15 LOZENGE BUCCAL
Status: DISCONTINUED | OUTPATIENT
Start: 2024-03-17 | End: 2024-03-18 | Stop reason: HOSPADM

## 2024-03-17 RX ORDER — ASPIRIN 81 MG/1
324 TABLET, CHEWABLE ORAL ONCE
Status: DISCONTINUED | OUTPATIENT
Start: 2024-03-17 | End: 2024-03-17

## 2024-03-17 RX ORDER — AMOXICILLIN 250 MG
2 CAPSULE ORAL 2 TIMES DAILY PRN
Status: DISCONTINUED | OUTPATIENT
Start: 2024-03-17 | End: 2024-03-18 | Stop reason: HOSPADM

## 2024-03-17 RX ORDER — SODIUM CHLORIDE 0.9 % (FLUSH) 0.9 %
10 SYRINGE (ML) INJECTION AS NEEDED
Status: DISCONTINUED | OUTPATIENT
Start: 2024-03-17 | End: 2024-03-18 | Stop reason: HOSPADM

## 2024-03-17 RX ORDER — SODIUM CHLORIDE 0.9 % (FLUSH) 0.9 %
10 SYRINGE (ML) INJECTION EVERY 12 HOURS SCHEDULED
Status: DISCONTINUED | OUTPATIENT
Start: 2024-03-17 | End: 2024-03-18 | Stop reason: HOSPADM

## 2024-03-17 RX ORDER — BISACODYL 5 MG/1
5 TABLET, DELAYED RELEASE ORAL DAILY PRN
Status: DISCONTINUED | OUTPATIENT
Start: 2024-03-17 | End: 2024-03-18 | Stop reason: HOSPADM

## 2024-03-17 RX ORDER — ENOXAPARIN SODIUM 150 MG/ML
1 INJECTION SUBCUTANEOUS ONCE
Status: COMPLETED | OUTPATIENT
Start: 2024-03-17 | End: 2024-03-17

## 2024-03-17 RX ORDER — POLYETHYLENE GLYCOL 3350 17 G/17G
17 POWDER, FOR SOLUTION ORAL DAILY PRN
Status: DISCONTINUED | OUTPATIENT
Start: 2024-03-17 | End: 2024-03-18 | Stop reason: HOSPADM

## 2024-03-17 RX ORDER — BISACODYL 10 MG
10 SUPPOSITORY, RECTAL RECTAL DAILY PRN
Status: DISCONTINUED | OUTPATIENT
Start: 2024-03-17 | End: 2024-03-18 | Stop reason: HOSPADM

## 2024-03-17 RX ORDER — SODIUM CHLORIDE 9 MG/ML
40 INJECTION, SOLUTION INTRAVENOUS AS NEEDED
Status: DISCONTINUED | OUTPATIENT
Start: 2024-03-17 | End: 2024-03-18 | Stop reason: HOSPADM

## 2024-03-17 RX ORDER — ASPIRIN 81 MG/1
81 TABLET ORAL DAILY
Status: DISCONTINUED | OUTPATIENT
Start: 2024-03-17 | End: 2024-03-18 | Stop reason: HOSPADM

## 2024-03-17 RX ADMIN — INSULIN HUMAN 2 UNITS: 100 INJECTION, SOLUTION PARENTERAL at 17:54

## 2024-03-17 RX ADMIN — INSULIN HUMAN 3 UNITS: 100 INJECTION, SOLUTION PARENTERAL at 08:09

## 2024-03-17 RX ADMIN — Medication 10 ML: at 20:28

## 2024-03-17 RX ADMIN — Medication 10 ML: at 08:10

## 2024-03-17 RX ADMIN — KETOROLAC TROMETHAMINE 30 MG: 30 INJECTION, SOLUTION INTRAMUSCULAR; INTRAVENOUS at 01:44

## 2024-03-17 RX ADMIN — ENOXAPARIN SODIUM 135 MG: 150 INJECTION SUBCUTANEOUS at 03:32

## 2024-03-17 RX ADMIN — NITROGLYCERIN 0.5 INCH: 20 OINTMENT TOPICAL at 03:32

## 2024-03-17 RX ADMIN — ASPIRIN 81 MG: 81 TABLET, COATED ORAL at 08:09

## 2024-03-17 NOTE — PLAN OF CARE
Goal Outcome Evaluation:   No complaints at this time. Call light in reach. NPO at midnight.

## 2024-03-17 NOTE — ED PROVIDER NOTES
Time: 1:32 AM EDT  Date of encounter:  3/17/2024  Independent Historian/Clinical History and Information was obtained by:   Patient  Chief Complaint: Chest discomfort    History is limited by: N/A    History of Present Illness:  Patient is a 58 y.o. year old male who presents to the emergency department for evaluation of chest discomfort.  The patient notes that he began having chest discomfort yesterday while at work.  He states that lasted intermittently for an hour.  He states during that time he had left upper arm pain.  He states the pain did not necessarily radiate to the left arm though.  He describes as sharp in nature.  He said eventually it subsided.  He states that it then reoccurred today.  It is waxing and waning.  He localizes the pain to the left side of the chest and he has an occasional left arm pain again.  It does not radiate.  He has no radiation of pain to the back or neck.  Patient does note some left-sided jaw pain as well.  The patient had no nausea or vomiting.  The patient had no diaphoresis.  Patient had no shortness of breath.  The patient states that he felt slightly lightheaded with it.  Patient denies any fatigue, near-syncope or syncope.  The patient has had no new back pain or abdominal pain.  The patient has no swelling in legs or pain in the calves.  Patient does note he has a history of hypertension, high cholesterol and diabetes.  The patient states he has had a stress test in [place] but that was about 4 years ago.  Patient has no history of aneurysm or dissection.  Patient has no previous history of DVT or pulmonary embolism.  The patient has had no recent immobilization, travel or surgery in the last 6 weeks.  The patient does not have cancer.  The patient has no clinical signs of DVT.    HPI    Patient Care Team  Primary Care Provider: Bakari Coppola MD    Past Medical History:     Allergies   Allergen Reactions    Semaglutide Nausea And Vomiting     Pancreatitis.    Codeine  Unknown (See Comments)     'Feels ill'     Past Medical History:   Diagnosis Date    Asthma     Diabetes mellitus     Hypertension      Past Surgical History:   Procedure Laterality Date    APPENDECTOMY      CHOLECYSTECTOMY OPEN Bilateral      History reviewed. No pertinent family history.    Home Medications:  Prior to Admission medications    Medication Sig Start Date End Date Taking? Authorizing Provider   Aspirin Low Dose 81 MG EC tablet Take 1 tablet by mouth Daily. 3/7/24  Yes Bakari Coppola MD   albuterol sulfate HFA (Ventolin HFA) 108 (90 Base) MCG/ACT inhaler Inhale 1 puff 4 (Four) Times a Day.    Provider, MD Giovanny   glimepiride (AMARYL) 4 MG tablet Take 1 tablet by mouth Every Morning Before Breakfast. 1/23/24   Bakari Coppola MD   glucose blood test strip 1 each by Other route Daily. Use as instructed 3/11/24   Bakari Coppola MD   glucose monitor monitoring kit Use 1 each Daily. 3/11/24   Bakari Coppola MD   Insulin Glargine (LANTUS SOLOSTAR) 100 UNIT/ML injection pen Inject 20 Units under the skin into the appropriate area as directed Every Night for 180 days. 3/7/24 9/3/24  Bakari Coppola MD   Insulin Pen Needle (Pen Needles) 31G X 6 MM misc Use 1 each Daily. 3/11/24   Bakari Coppola MD   Jardiance 25 MG tablet tablet Take 1 tablet by mouth Daily. 1/23/24   Bakari Coppola MD   Lancets misc Use 1 each Daily. 3/11/24   Bakari Coppola MD   lansoprazole (PREVACID) 30 MG capsule Take 1 capsule by mouth Daily. 1/23/24   Bakari Coppola MD   lisinopril (PRINIVIL,ZESTRIL) 10 MG tablet Take 1 tablet by mouth Daily. 1/23/24   Bakari Coppola MD   metFORMIN (GLUCOPHAGE) 1000 MG tablet Take 1 tablet by mouth Every 12 (Twelve) Hours. 1/23/24   Bakari Coppola MD   metoprolol succinate XL (TOPROL-XL) 25 MG 24 hr tablet Take 1 tablet by mouth Daily. 1/23/24   Bakari Coppola MD   rosuvastatin (CRESTOR) 40 MG tablet Take 1 tablet by mouth Daily. 1/23/24   Bakari Coppola MD   sildenafil (VIAGRA) 100 MG tablet Take 1  "tablet by mouth Daily As Needed for Erectile Dysfunction.    Provider, MD Giovanny   valACYclovir (VALTREX) 500 MG tablet Take 1 tablet by mouth As Needed (frequent eye infections). 3/7/24   Bakari Coppola MD        Social History:   Social History     Tobacco Use    Smoking status: Never     Passive exposure: Never    Smokeless tobacco: Never   Vaping Use    Vaping status: Never Used   Substance Use Topics    Alcohol use: Yes     Comment: occ    Drug use: Never         Review of Systems:  Review of Systems   Constitutional:  Negative for chills, diaphoresis and fever.   HENT:  Negative for congestion, postnasal drip, rhinorrhea and sore throat.    Eyes:  Negative for photophobia.   Respiratory:  Negative for cough, chest tightness and shortness of breath.    Cardiovascular:  Positive for chest pain. Negative for palpitations and leg swelling.   Gastrointestinal:  Negative for abdominal pain, diarrhea, nausea and vomiting.   Genitourinary:  Negative for difficulty urinating, dysuria, flank pain, frequency, hematuria and urgency.   Musculoskeletal:  Positive for myalgias. Negative for neck pain and neck stiffness.   Skin:  Negative for pallor and rash.   Neurological:  Positive for light-headedness. Negative for dizziness, syncope, weakness, numbness and headaches.   Hematological:  Negative for adenopathy. Does not bruise/bleed easily.   Psychiatric/Behavioral: Negative.          Physical Exam:  /74 (BP Location: Left arm, Patient Position: Lying)   Pulse 64   Temp 97.8 °F (36.6 °C) (Oral)   Resp 18   Ht 177.8 cm (70\")   Wt 127 kg (279 lb 1.6 oz)   SpO2 94%   BMI 40.05 kg/m²     Physical Exam  Vitals and nursing note reviewed.   Constitutional:       General: He is not in acute distress.     Appearance: Normal appearance. He is not ill-appearing, toxic-appearing or diaphoretic.   HENT:      Head: Normocephalic and atraumatic.      Mouth/Throat:      Mouth: Mucous membranes are moist.   Eyes:      " Pupils: Pupils are equal, round, and reactive to light.   Cardiovascular:      Rate and Rhythm: Normal rate and regular rhythm.      Pulses: Normal pulses.           Carotid pulses are 2+ on the right side and 2+ on the left side.       Radial pulses are 2+ on the right side and 2+ on the left side.        Femoral pulses are 2+ on the right side and 2+ on the left side.       Popliteal pulses are 2+ on the right side and 2+ on the left side.        Dorsalis pedis pulses are 2+ on the right side and 2+ on the left side.        Posterior tibial pulses are 2+ on the right side and 2+ on the left side.      Heart sounds: Normal heart sounds. No murmur heard.  Pulmonary:      Effort: Pulmonary effort is normal. No accessory muscle usage, respiratory distress or retractions.      Breath sounds: Normal breath sounds. No decreased breath sounds, wheezing, rhonchi or rales.   Chest:      Chest wall: Tenderness present. No mass.      Comments: Patient states that he has some tenderness palpation of the left anterior chest wall  Abdominal:      General: Abdomen is flat. There is no distension.      Palpations: Abdomen is soft. There is no mass or pulsatile mass.      Tenderness: There is no abdominal tenderness. There is no right CVA tenderness, left CVA tenderness, guarding or rebound.      Comments: No rigidity   Musculoskeletal:         General: No swelling, tenderness or deformity.      Cervical back: Neck supple. No tenderness.      Right lower leg: No tenderness. No edema.      Left lower leg: No tenderness. No edema.   Skin:     General: Skin is warm and dry.      Capillary Refill: Capillary refill takes less than 2 seconds.      Coloration: Skin is not jaundiced or pale.      Findings: No erythema.   Neurological:      General: No focal deficit present.      Mental Status: He is alert and oriented to person, place, and time. Mental status is at baseline.      Cranial Nerves: Cranial nerves 2-12 are intact. No cranial  nerve deficit.      Sensory: Sensation is intact. No sensory deficit.      Motor: Motor function is intact. No weakness or pronator drift.      Coordination: Coordination is intact. Coordination normal.   Psychiatric:         Mood and Affect: Mood normal.         Behavior: Behavior normal.                  Procedures:  Procedures      Medical Decision Making:      Comorbidities that affect care:    Hypertension, diabetes, hypercholesteremia    External Notes reviewed:    None      The following orders were placed and all results were independently analyzed by me:  Orders Placed This Encounter   Procedures    XR Chest 1 View    [place] Draw    High Sensitivity Troponin T    Comprehensive Metabolic Panel    Lipase    BNP    Magnesium    CBC Auto Differential    High Sensitivity Troponin T 2Hr    NPO Diet NPO Type: Strict NPO    Undress & Gown    Continuous Pulse Oximetry    Inpatient Hospitalist Consult    Oxygen Therapy- Nasal Cannula; Titrate 1-6 LPM Per SpO2; 90 - 95%    ECG 12 Lead ED Triage Standing Order; Chest Pain    ECG 12 Lead ED Triage Standing Order; Chest Pain    Insert Peripheral IV    CBC & Differential    Green Top (Gel)    Lavender Top    Gold Top - SST    Light Blue Top       Medications Given in the Emergency Department:  Medications   sodium chloride 0.9 % flush 10 mL (has no administration in time range)   aspirin chewable tablet 324 mg (324 mg Oral Not Given 3/17/24 0104)   Enoxaparin Sodium (LOVENOX) syringe 135 mg (has no administration in time range)   nitroglycerin (NITROSTAT) ointment 0.5 inch (has no administration in time range)   ketorolac (TORADOL) injection 30 mg (30 mg Intravenous Given 3/17/24 0144)        ED Course:    ED Course as of 03/17/24 0321   Sun Mar 17, 2024   0008 EKG:    Rhythm: Sinus rhythm  Rate: 67  Intervals: Normal CO and QT interval  T-wave: Nonspecific T wave flattening  ST Segment: No pathological ST elevation and reciprocal ST depression to suggest STEMI    EKG  Comparison: No EKG available for comparison    Interpreted by me   [SD]   0227 EKG:    Rhythm: Sinus rhythm  Rate: 65  Intervals: Prolonged VA and normal QT interval  T-wave: Nonspecific T wave flattening  ST Segment: No obvious pathological ST elevation and reciprocal ST depression to suggest STEMI    EKG Comparison: No change in the QRS and ST morphology from the EKG that was performed earlier in the department    Interpreted by me   [SD]      ED Course User Index  [SD] Domingo Chang DO       Labs:    Lab Results (last 24 hours)       Procedure Component Value Units Date/Time    High Sensitivity Troponin T [[id number]]  (Abnormal) Collected: 03/17/24 0011    Specimen: Blood Updated: 03/17/24 0053     HS Troponin T 22 ng/L     Narrative:      High Sensitive Troponin T Reference Range:  <14.0 ng/L- Negative Female for AMI  <22.0 ng/L- Negative Male for AMI  >=14 - Abnormal Female indicating possible myocardial injury.  >=22 - Abnormal Male indicating possible myocardial injury.   Clinicians would have to utilize clinical acumen, EKG, Troponin, and serial changes to determine if it is an Acute Myocardial Infarction or myocardial injury due to an underlying chronic condition.         CBC & Differential [[id number]]  (Abnormal) Collected: 03/17/24 0011    Specimen: Blood Updated: 03/17/24 0030    Narrative:      The following orders were created for panel order CBC & Differential.  Procedure                               Abnormality         Status                     ---------                               -----------         ------                     CBC Auto Differential[[id number]]        Abnormal            Final result                 Please view results for these tests on the individual orders.    Comprehensive Metabolic Panel [[id number]]  (Abnormal) Collected: 03/17/24 0011    Specimen: Blood Updated: 03/17/24 0053     Glucose 286 mg/dL      BUN 28 mg/dL      Creatinine 1.33 mg/dL      Sodium 136 mmol/L       Potassium 4.7 mmol/L      Comment: Slight hemolysis detected by analyzer. Result may be falsely elevated.        Chloride 103 mmol/L      CO2 20.9 mmol/L      Calcium 9.1 mg/dL      Total Protein 6.6 g/dL      Albumin 3.6 g/dL      ALT (SGPT) 23 U/L      AST (SGOT) 20 U/L      Alkaline Phosphatase 124 U/L      Total Bilirubin 1.1 mg/dL      Globulin 3.0 gm/dL      A/G Ratio 1.2 g/dL      BUN/Creatinine Ratio 21.1     Anion Gap 12.1 mmol/L      eGFR 62.3 mL/min/1.73     Narrative:      GFR Normal >60  Chronic Kidney Disease <60  Kidney Failure <15      Lipase [[id number]]  (Normal) Collected: 03/17/24 0011    Specimen: Blood Updated: 03/17/24 0053     Lipase 46 U/L     BNP [[id number]]  (Normal) Collected: 03/17/24 0011    Specimen: Blood Updated: 03/17/24 0050     proBNP 148.2 pg/mL     Narrative:      This assay is used as an aid in the diagnosis of individuals suspected of having heart failure. It can be used as an aid in the diagnosis of acute decompensated heart failure (ADHF) in patients presenting with signs and symptoms of ADHF to the emergency department (ED). In addition, NT-proBNP of <300 pg/mL indicates ADHF is not likely.    Age Range Result Interpretation  NT-proBNP Concentration (pg/mL:      <50             Positive            >450                   Gray                 300-450                    Negative             <300    50-75           Positive            >900                  Gray                300-900                  Negative            <300      >75             Positive            >1800                  Gray                300-1800                  Negative            <300    Magnesium [[id number]]  (Normal) Collected: 03/17/24 0011    Specimen: Blood Updated: 03/17/24 0053     Magnesium 2.4 mg/dL     CBC Auto Differential [[id number]]  (Abnormal) Collected: 03/17/24 0011    Specimen: Blood Updated: 03/17/24 0030     WBC 6.40 10*3/mm3      RBC 5.41 10*6/mm3      Hemoglobin 15.3 g/dL       Hematocrit 46.9 %      MCV 86.7 fL      MCH 28.3 pg      MCHC 32.6 g/dL      RDW 12.1 %      RDW-SD 38.4 fl      MPV 9.3 fL      Platelets 198 10*3/mm3      Neutrophil % 46.2 %      Lymphocyte % 38.9 %      Monocyte % 10.2 %      Eosinophil % 3.1 %      Basophil % 1.1 %      Immature Grans % 0.5 %      Neutrophils, Absolute 2.96 10*3/mm3      Lymphocytes, Absolute 2.49 10*3/mm3      Monocytes, Absolute 0.65 10*3/mm3      Eosinophils, Absolute 0.20 10*3/mm3      Basophils, Absolute 0.07 10*3/mm3      Immature Grans, Absolute 0.03 10*3/mm3      nRBC 0.0 /100 WBC     High Sensitivity Troponin T 2Hr [[id number]]  (Normal) Collected: 03/17/24 0207    Specimen: Blood Updated: 03/17/24 0304     HS Troponin T 21 ng/L      Troponin T Delta -1 ng/L     Narrative:      High Sensitive Troponin T Reference Range:  <14.0 ng/L- Negative Female for AMI  <22.0 ng/L- Negative Male for AMI  >=14 - Abnormal Female indicating possible myocardial injury.  >=22 - Abnormal Male indicating possible myocardial injury.   Clinicians would have to utilize clinical acumen, EKG, Troponin, and serial changes to determine if it is an Acute Myocardial Infarction or myocardial injury due to an underlying chronic condition.                  Imaging:    XR Chest 1 View    Result Date: 3/17/2024  PROCEDURE: XR CHEST 1 VW  COMPARISON: None  INDICATIONS: Chest Pain Triage Protocol  FINDINGS:  Cardiac and mediastinal contours are within normal limits.  Lung volumes are relatively low, and there is some mild bibasilar atelectasis.  Lungs are otherwise clear.  Pulmonary vascularity is normal.  No pneumothorax is identified.       Low lung volumes with some mild bibasilar atelectasis.       LEON GALLOWAY MD       Electronically Signed and Approved By: LEON GALLOWAY MD on 3/17/2024 at 0:25                Differential Diagnosis and Discussion:    Chest Pain:  Based on the patient's signs and symptoms, I considered aortic dissection, myocardial infaction,  pulmonary embolism, cardiac tamponade, pericarditis, pneumothorax, musculoskeletal chest pain and other differential diagnosis as an etiology of the patient's chest pain.     All labs were reviewed and interpreted by me.  All X-rays impressions were independently interpreted by me.  EKG was interpreted by me.    MDM  Number of Diagnoses or Management Options  Chest discomfort  Diagnosis management comments: The patient's CBC was reviewed and shows no abnormalities of critical concern.  Of note, there is no anemia requiring a blood transfusion and the platelet count is acceptable    The patient's CMP was reviewed and shows no abnormalities of critical concern.  Of note, the patient's sodium and potassium are acceptable.  The patient's liver enzymes are unremarkable.  The patient's renal function including creatinine is preserved.  The patient has a normal anion gap.    The patient had a normal proBNP.  This makes acute decompensated heart failure unlikely    The patient had 2 EKGs that demonstrated no evidence of STEMI    Chest x-ray was performed while in the emergency room.  The chest x-ray demonstrated no acute cardiopulmonary process    HEART Score for Major Cardiac Events - MDCalc  Calculated on Mar 17 2024 1:39 AM  5 points -> Moderate Score (4-6 points) Risk of MACE of 12-16.6%.  If troponin is positive, many experts recommend further workup and admission even with a low HEART Score.    I discussed the patient's heart score with him.  Also reviewed the patient's CT angiogram of the coronary arteries from 2021.  After reviewing the CT angiogram and the risk of the heart score, the patient is requesting admission to the hospital for cardiac consultation and possible stress test.  At the time of admission, the patient is resting comfortably no acute distress and nontoxic.  The patient's chest pain has resolved       Amount and/or Complexity of Data Reviewed  Clinical lab tests: reviewed  Tests in the radiology  section of CPT®: reviewed  Tests in the medicine section of CPT®: reviewed  Review and summarize past medical records: yes (Reviewed the patient's CTA of his coronary arteries from 2021 and [facility] and have enclosed the report    SUMMARY:    1. Abnormal coronary arteries within limitations of CT technique.  2. 50-70% stenosis of the proximal RCA and 25-50% stenosis of the LAD.    Uninterpretable stenosis of the OM and mid RCA.  3. Calcification in the ascending aorta.    4. Normal pericardium and cardiac morphology within limitations of CT  technique.    These findings were reported to Dr. Rhoades.      Consider preventive pharmacotherapy as well as risk factor modification  per guideline-directed care in patients with coronary artery disease.    This cardiac scan was interpreted by a cardiologist with respect to the  heart only. Please consult the radiology report for reference to  non-cardiac findings.    7/27/2021 3:43 PM  John Navarro MD  Exam End: 07/27/21 15:36   Specimen Collected: 07/27/21 15:36 Last Resulted: 07/27/21 16:39  Received From: [facility] and [place])  Result Received: 12/18/23 13:    )  Discuss the patient with other providers: yes (03:19 EDT  I discussed the case with the hospitalist.  We have discussed the patient's presenting symptoms, laboratory values, imaging and condition at the time of admission.  They will evaluate the patient in the emergency room and admit the patient to the hospital)             Social Determinants of Health:    Patient is independent, reliable, and has access to care.       Disposition and Care Coordination:    Admit:   Through independent evaluation of the patient's history, physical, and imperical data, the patient meets criteria for inpatient admission to the hospital.        Final diagnoses:   Chest discomfort        ED Disposition       ED Disposition   Decision to Admit    Condition   --    Comment   --                This medical record created using voice recognition software.             Domingo Chang DO  03/17/24 2411

## 2024-03-17 NOTE — H&P
[facility] HISTORY AND PHYSICAL  Date: 3/17/2024   Patient Name: Cisco Acevedo  : 1965  MRN: [id number]  Primary Care Physician:  Bakari Coppola MD  Date of admission: 3/17/2024    Subjective   Subjective     Chief Complaint: Sharp chest pain    HPI:    Cisco Acevedo is a 58 y.o. male with a past medical history of hypertension, hyperlipidemia, type 2 diabetes mellitus, CKD stage III, presented to the ED for evaluation of sharp chest pain that last for few seconds and goes away.  Patient had this pain episodes multiple times the day before and resolved and yesterday he had these episodes likely and has come to the ED for evaluation.  When he had these episodes he also noted to have sharp pain in his left arm and also occasionally on his left face.  Denies any shortness of breath, nausea, vomiting, diaphoresis, focal weakness, numbness, tingling.    In the ED, vital signs temperature 97.8, pulse 67, respiratory rate 18, blood pressure 136/74 on room air saturating around 94%.  Initial troponin 22, repeat troponin 21, proBNP 148, sodium 136, potassium 4.7, chloride 103, bicarb 20.9, BUN 28, creatinine 1.33, 2 weeks ago was 1.46, rest of the CMP with no significant findings, normal CBC, normal lipase levels.  Chest x-ray showed no acute abnormality.  EKG showed sinus rhythm with no significant findings concerning for ischemic heart disease.  Patient has been admitted for further evaluation and management of chest pain.      Personal History     Past Medical History:   Diagnosis Date    Asthma     Diabetes mellitus     Hypertension          Past Surgical History:   Procedure Laterality Date    APPENDECTOMY      CHOLECYSTECTOMY OPEN Bilateral          History reviewed. No pertinent family history.      Social History     Socioeconomic History    Marital status:    Tobacco Use    Smoking status: Never     Passive exposure: Never    Smokeless tobacco: Never   Vaping Use    Vaping status: Never  Used   Substance and Sexual Activity    Alcohol use: Yes     Comment: occ    Drug use: Never    Sexual activity: Yes         Home Medications:  Insulin Glargine, Lancets, Pen Needles, albuterol sulfate HFA, aspirin, empagliflozin, glimepiride, glucose blood, glucose monitor, lansoprazole, lisinopril, metFORMIN, metoprolol succinate XL, rosuvastatin, sildenafil, and valACYclovir    Allergies:  Allergies   Allergen Reactions    Semaglutide Nausea And Vomiting     Pancreatitis.    Codeine Unknown (See Comments)     'Feels ill'       Review of Systems   All other systems reviewed and negative except as mentioned above in the HPI    Objective   Objective     Vitals:   Temp:  [97.8 °F (36.6 °C)] 97.8 °F (36.6 °C)  Heart Rate:  [60-68] 60  Resp:  [17-18] 17  BP: (113-136)/(68-74) 113/68    Physical Exam    Constitutional: Awake, alert, no acute distress   Eyes: Pupils equal, sclerae anicteric, no conjunctival injection   HENT: NCAT, mucous membranes moist, no facial tenderness   Respiratory: Clear to auscultation bilaterally, nonlabored respirations    Cardiovascular: RRR, no murmurs   Gastrointestinal: Positive bowel sounds, soft, nontender, nondistended   Musculoskeletal: No bilateral ankle edema, no clubbing or cyanosis to extremities   Psychiatric: Appropriate affect, cooperative   Neurologic: Oriented x 3,  speech clear   Skin: No rashes     Result Review    Result Review:  I have personally reviewed the results from the time of this admission to 3/17/2024 05:11 EDT and agree with these findings:  [x]  Laboratory  []  Microbiology  [x]  Radiology  [x]  EKG/Telemetry   []  Cardiology/Vascular   []  Pathology  []  Old records  []  Other:        Imaging Results (Last 24 Hours)       Procedure Component Value Units Date/Time    XR Chest 1 View [[id number]] Collected: 03/17/24 0025     Updated: 03/17/24 0028    Narrative:      PROCEDURE: XR CHEST 1 VW     COMPARISON: None     INDICATIONS: Chest Pain Triage Protocol      FINDINGS:   Cardiac and mediastinal contours are within normal limits.  Lung volumes are relatively low, and   there is some mild bibasilar atelectasis.  Lungs are otherwise clear.  Pulmonary vascularity is   normal.  No pneumothorax is identified.       Impression:       Low lung volumes with some mild bibasilar atelectasis.                  LEON GALLOWAY MD         Electronically Signed and Approved By: LEON GALLOWAY MD on 3/17/2024 at 0:25                              aspirin, 324 mg, Oral, Once         Assessment & Plan   Assessment / Plan     Assessment/Plan:   Atypical chest pain  Hypertension  Hyperlipidemia  Obesity  Diabetes mellitus  CKD stage III    Plan  Admit to observation, telemetry  Given his risk factors patient has been advised by the ED physician that he can follow-up with his outpatient cardiology but patient is concerned and requested to get admitted for further evaluation by cardiology regarding atypical chest pain.  Cardiology consult in the a.m.  Cardiac echo  N.p.o. except sips with meds  Aspirin 81 mg  A1c  Lipid panel  Hypoglycemic protocol  Low-dose sliding scale insulin  Resume other appropriate home medications once reconciled      DVT prophylaxis:  Mechanical DVT prophylaxis orders are signed and held.        CODE STATUS:    Level Of Support Discussed With: Patient  Code Status (Patient has no pulse and is not breathing): CPR (Attempt to Resuscitate)  Medical Interventions (Patient has pulse or is breathing): Full Support      Admission Status:  I believe this patient meets observation status.    Part of this note may be an electronic transcription/translation of spoken language to printed text using the Dragon Dictation System    Cielo Tolbert MD

## 2024-03-17 NOTE — PLAN OF CARE
Patient seen evaluated on this morning.  Chart has been reviewed.  Patient is without chest pain at the time of my evaluation but does report of episodes of chest pain since admission but resolved on its own.  Cardiac enzymes have been trended and that trend is flat.  Cardiology is consulted will await further recommendations.

## 2024-03-17 NOTE — CONSULTS
[facility]   Cardiology Consult Note    Patient Name: Cisco Acevedo  : 1965  MRN: [id number]  Primary Care Physician:  Bakari Coppola MD  Referring Physician: No ref. provider found    Date of admission: 3/17/2024    Subjective   Subjective     Reason for Consultation : Chest discomfort    Chief Complaint : Chest discomfort and underlying coronary artery disease.    HPI:  Cisco Acevedo is a 58 y.o. male with past medical history significant for essential hypertension, type 2 diabetes, chronic kidney disease stage III, hyperlipidemia and coronary artery disease.  The patient presented with chest discomfort with radiation to the left part of the face.  He was having recurrent episodes throughout the day yesterday.  This cough was unrelated with exertion.  He denies palpitations, diaphoresis or dyspnea.  EKG shows sinus rhythm with repolarization abnormalities and high sensitive troponin T was 23.  At the time of this evaluation he was chest pain-free.  He had a history of CTA of the coronaries few years ago which showed 70% stenosis of the proximal right coronary artery, 25% stenosis of the left anterior descending coronary artery and circumflex artery.    Review of Systems   All systems were reviewed and negative except for: Chest discomfort.    Personal History     Past Medical History:   Diagnosis Date    Asthma     Diabetes mellitus     Hypertension         Reviewed.      Family History: family history is not on file. Otherwise pertinent FHx was reviewed and not pertinent to current issue.    Social History:  reports that he has never smoked. He has never been exposed to tobacco smoke. He has never used smokeless tobacco. He reports current alcohol use. He reports that he does not use drugs.    Home Medications:  Insulin Glargine, albuterol sulfate HFA, aspirin, empagliflozin, glimepiride, lansoprazole, lisinopril, metFORMIN, metoprolol succinate XL, rosuvastatin, sildenafil, and  valACYclovir    Allergies:  Allergies   Allergen Reactions    Semaglutide Nausea And Vomiting     Pancreatitis.    Codeine Unknown (See Comments)     'Feels ill'       Objective    Objective     Vitals:   Temp:  [97.1 °F (36.2 °C)-97.8 °F (36.6 °C)] 97.3 °F (36.3 °C)  Heart Rate:  [60-68] 61  Resp:  [16-18] 16  BP: (113-136)/(60-78) 131/78      Physical Exam:   Constitutional: Awake, alert, No acute distress    Eyes: PERRLA.   HENT: NCAT, mucous membranes moist   Neck: Supple, no thyromegaly, no lymphadenopathy, trachea midline No JVD.    Respiratory: Clear to auscultation bilaterally, nonlabored respirations    Cardiovascular: RRR, no murmurs, rubs, or gallops, palpable pedal pulses bilaterally   Gastrointestinal: Positive bowel sounds, soft, nontender, nondistended   Musculoskeletal: No bilateral ankle edema, no clubbing or cyanosis to extremities   Psychiatric: Appropriate affect, cooperative   Neurologic: Oriented x 3, strength symmetric in all extremities, Cranial Nerves grossly intact to confrontation, speech clear   Skin: No rashes     Result Review    Result Review:  I have personally reviewed the results from the time of this admission to 3/17/2024 12:36 EDT and agree with these findings:  [x]  Laboratory  []  Microbiology  [x]  Radiology  [x]  EKG/Telemetry   [x]  Cardiology/Vascular   []  Pathology  [x]  Old records  []  Other:  Most notable findings include:     CMP          3/2/2024    08:12 3/17/2024    00:11 3/17/2024    06:15   CMP   Glucose 313  286  291    BUN 26  28  30    Creatinine 1.46  1.33  1.42    EGFR 55.7  62.3  57.6    Sodium 142  136  138    Potassium 5.3  4.7  4.2    Chloride 103  103  105    Calcium 10.0  9.1  8.4    Total Protein 7.5  6.6  5.8    Albumin 4.3  3.6  3.4    Globulin 3.2  3.0  2.4    Total Bilirubin 1.1  1.1  0.9    Alkaline Phosphatase 125  124  97    AST (SGOT) 20  20  22    ALT (SGPT) 23  23  22    Albumin/Globulin Ratio 1.3  1.2  1.4    BUN/Creatinine Ratio 17.8  21.1   21.1    Anion Gap 13.7  12.1  10.4       CBC          7/8/2023    08:08 3/2/2024    08:12 3/17/2024    00:11 3/17/2024    06:15   CBC   WBC 7.0     5.67  6.40  5.46    RBC 5.54     6.18  5.41  5.00    Hemoglobin 16.1     17.2  15.3  14.1    Hematocrit 47.5     52.7  46.9  43.9    MCV 86     85.3  86.7  87.8    MCH 29     27.8  28.3  28.2    MCHC 34     32.6  32.6  32.1    RDW 13     12.5  12.1  12.1    Platelets 199     214  198  164       Details          This result is from an external source.              Lab Results   Component Value Date    TROPONINT 23 (H) 03/17/2024         Assessment & Plan   Assessment / Plan     Brief Patient Summary:  Cisco Acevedo is a 58 y.o. male who chest discomfort with typical and atypical features of angina..    Active Hospital Problems:  Active Hospital Problems    Diagnosis     **Chest pain     Coronary artery disease involving coronary bypass graft of native heart with angina pectoris          Plan:    Patient is known to have coronary artery disease demonstrated by CTA.  He also have chronic kidney disease stage III.  I will optimize medical therapy.  Continue beta-blockers high-dose statin therapy blood pressure control.  Due to his chronic kidney disease with creatinine 1.4-1.5, proceed with nuclear stress test to evaluate for possible progression of disease and determine degree of ischemic burden.  If the patient have recurrent symptoms or significant ischemia on stress test we will consider coronary angiography and revascularization.     Electronically signed by Mac Rosa MD, 03/17/24, 12:28 PM EDT.

## 2024-03-18 ENCOUNTER — READMISSION MANAGEMENT (OUTPATIENT)
Dept: CALL CENTER | Facility: HOSPITAL | Age: 59
End: 2024-03-18
Payer: COMMERCIAL

## 2024-03-18 ENCOUNTER — APPOINTMENT (OUTPATIENT)
Dept: NUCLEAR MEDICINE | Facility: HOSPITAL | Age: 59
End: 2024-03-18
Payer: COMMERCIAL

## 2024-03-18 VITALS
RESPIRATION RATE: 18 BRPM | BODY MASS INDEX: 39.29 KG/M2 | HEIGHT: 70 IN | WEIGHT: 274.47 LBS | DIASTOLIC BLOOD PRESSURE: 74 MMHG | SYSTOLIC BLOOD PRESSURE: 131 MMHG | OXYGEN SATURATION: 96 % | HEART RATE: 74 BPM | TEMPERATURE: 98.1 F

## 2024-03-18 LAB
BH CV IMMEDIATE POST TECH DATA BLOOD PRESSURE: NORMAL MMHG
BH CV IMMEDIATE POST TECH DATA HEART RATE: 118 BPM
BH CV IMMEDIATE POST TECH DATA OXYGEN SATS: 97 %
BH CV NUCLEAR PRIOR STUDY: 3
BH CV REST NUCLEAR ISOTOPE DOSE: 9.7 MCI
BH CV SIX MINUTE RECOVERY TECH DATA BLOOD PRESSURE: NORMAL
BH CV SIX MINUTE RECOVERY TECH DATA HEART RATE: 93 BPM
BH CV SIX MINUTE RECOVERY TECH DATA OXYGEN SATURATION: 97 %
BH CV STRESS BP STAGE 1: NORMAL
BH CV STRESS BP STAGE 2: NORMAL
BH CV STRESS BP STAGE 3: NORMAL
BH CV STRESS DURATION MIN STAGE 1: 3
BH CV STRESS DURATION MIN STAGE 2: 3
BH CV STRESS DURATION MIN STAGE 3: 3
BH CV STRESS DURATION SEC STAGE 1: 0
BH CV STRESS DURATION SEC STAGE 2: 0
BH CV STRESS DURATION SEC STAGE 3: 15
BH CV STRESS GRADE STAGE 1: 10
BH CV STRESS GRADE STAGE 2: 12
BH CV STRESS GRADE STAGE 3: 14
BH CV STRESS HR STAGE 1: 103
BH CV STRESS HR STAGE 2: 119
BH CV STRESS HR STAGE 3: 136
BH CV STRESS METS STAGE 1: 5
BH CV STRESS METS STAGE 2: 7.5
BH CV STRESS METS STAGE 3: 10
BH CV STRESS NUCLEAR ISOTOPE DOSE: 38 MCI
BH CV STRESS O2 STAGE 1: 97
BH CV STRESS O2 STAGE 2: 97
BH CV STRESS O2 STAGE 3: 97
BH CV STRESS PROTOCOL 1: NORMAL
BH CV STRESS RECOVERY BP: NORMAL MMHG
BH CV STRESS RECOVERY HR: 93 BPM
BH CV STRESS RECOVERY O2: 96 %
BH CV STRESS SPEED STAGE 1: 1.7
BH CV STRESS SPEED STAGE 2: 2.5
BH CV STRESS SPEED STAGE 3: 3.4
BH CV STRESS STAGE 1: 1
BH CV STRESS STAGE 2: 2
BH CV STRESS STAGE 3: 3
BH CV THREE MINUTE POST TECH DATA BLOOD PRESSURE: NORMAL MMHG
BH CV THREE MINUTE POST TECH DATA HEART RATE: 100 BPM
BH CV THREE MINUTE POST TECH DATA OXYGEN SATURATION: 97 %
GLUCOSE BLDC GLUCOMTR-MCNC: 131 MG/DL (ref 70–99)
GLUCOSE BLDC GLUCOMTR-MCNC: 254 MG/DL (ref 70–99)
LV EF NUC BP: 64 %
MAXIMAL PREDICTED HEART RATE: 163 BPM
PERCENT MAX PREDICTED HR: 83.44 %
STRESS BASELINE BP: NORMAL MMHG
STRESS BASELINE HR: 69 BPM
STRESS O2 SAT REST: 96 %
STRESS PERCENT HR: 98 %
STRESS POST ESTIMATED WORKLOAD: 10.3 METS
STRESS POST EXERCISE DUR MIN: 9 MIN
STRESS POST EXERCISE DUR SEC: 15 SEC
STRESS POST O2 SAT PEAK: 97 %
STRESS POST PEAK BP: NORMAL MMHG
STRESS POST PEAK HR: 136 BPM
STRESS TARGET HR: 139 BPM

## 2024-03-18 PROCEDURE — 93016 CV STRESS TEST SUPVJ ONLY: CPT | Performed by: NURSE PRACTITIONER

## 2024-03-18 PROCEDURE — 82948 REAGENT STRIP/BLOOD GLUCOSE: CPT

## 2024-03-18 PROCEDURE — 93018 CV STRESS TEST I&R ONLY: CPT | Performed by: INTERNAL MEDICINE

## 2024-03-18 PROCEDURE — G0378 HOSPITAL OBSERVATION PER HR: HCPCS

## 2024-03-18 PROCEDURE — 0 TECHNETIUM TETROFOSMIN KIT: Performed by: STUDENT IN AN ORGANIZED HEALTH CARE EDUCATION/TRAINING PROGRAM

## 2024-03-18 PROCEDURE — A9502 TC99M TETROFOSMIN: HCPCS | Performed by: STUDENT IN AN ORGANIZED HEALTH CARE EDUCATION/TRAINING PROGRAM

## 2024-03-18 PROCEDURE — 78452 HT MUSCLE IMAGE SPECT MULT: CPT

## 2024-03-18 PROCEDURE — 99239 HOSP IP/OBS DSCHRG MGMT >30: CPT | Performed by: STUDENT IN AN ORGANIZED HEALTH CARE EDUCATION/TRAINING PROGRAM

## 2024-03-18 PROCEDURE — 93017 CV STRESS TEST TRACING ONLY: CPT

## 2024-03-18 PROCEDURE — 63710000001 INSULIN REGULAR HUMAN PER 5 UNITS: Performed by: STUDENT IN AN ORGANIZED HEALTH CARE EDUCATION/TRAINING PROGRAM

## 2024-03-18 PROCEDURE — 78452 HT MUSCLE IMAGE SPECT MULT: CPT | Performed by: INTERNAL MEDICINE

## 2024-03-18 RX ADMIN — TETROFOSMIN 1 DOSE: 1.38 INJECTION, POWDER, LYOPHILIZED, FOR SOLUTION INTRAVENOUS at 10:25

## 2024-03-18 RX ADMIN — Medication 10 ML: at 08:54

## 2024-03-18 RX ADMIN — TETROFOSMIN 1 DOSE: 1.38 INJECTION, POWDER, LYOPHILIZED, FOR SOLUTION INTRAVENOUS at 11:39

## 2024-03-18 RX ADMIN — INSULIN HUMAN 4 UNITS: 100 INJECTION, SOLUTION PARENTERAL at 17:05

## 2024-03-18 NOTE — PLAN OF CARE
Goal Outcome Evaluation:  Plan of Care Reviewed With: patient            Shift has been uneventful.

## 2024-03-18 NOTE — DISCHARGE SUMMARY
[facility]         HOSPITALIST  DISCHARGE SUMMARY    Patient Name: Cisco Acevedo  : 1965  MRN: [id number]    Date of Admission: 3/17/2024  Date of Discharge:  3/18/2024    Primary Care Physician: Bakari Coppola MD    Consults       Date and Time Order Name Status Description    3/17/2024  6:03 AM Inpatient Cardiology Consult      3/17/2024  3:19 AM Inpatient Hospitalist Consult              Active and Resolved Hospital Problems:  Active Hospital Problems    Diagnosis POA    **Chest pain [R07.9] Yes    Coronary artery disease involving coronary bypass graft of native heart with angina pectoris [I25.709] Yes      Resolved Hospital Problems   No resolved problems to display.       Hospital Course     Hospital Course:  Cisco Acevedo is a 58 y.o. male with a past medical history of hypertension, hyperlipidemia, type 2 diabetes mellitus, CKD stage III, presented to the ED for evaluation of sharp chest pain that last for few seconds and goes away.     Patient was admitted for chest pain rule out.  His initial troponin was 22 and remained flat.  EKG was reviewed and had no acute ischemic changes.  Cardiology was consulted given his significant risk factors.  He went for stress test on 3/18/2024 that did not show any evidence of ischemia.  Cardiology recommended optimizing medical therapy.  He was continued on his home dose of aspirin, metoprolol, and high intensity statin.  Echo was obtained and reviewed, EF normal.    Hemoglobin A1c 11.8.  He is on glimepiride, Jardiance, Lantus 20 units daily, and metformin at home.  Recommend patient follow-up with his PCP for further titration of his medications and aggressive risk factor modification given his presentation with chest pain.    Patient discharged in stable condition.    DISCHARGE Follow Up Recommendations for labs and diagnostics: Follow-up with PCP in 1 week.  Follow-up with cardiology in 3 to 4 weeks.      Day of Discharge     Vital Signs:  Temp:   [97.9 °F (36.6 °C)-98.6 °F (37 °C)] 98.1 °F (36.7 °C)  Heart Rate:  [57-74] 74  Resp:  [16-18] 18  BP: (111-132)/(57-78) 131/74    Physical Exam:   Gen: NAD, Alert and Oriented  Cards: RRR, no murmur   Pulm: CTA b/l, no wheezing  Abd: soft, nondistended  Extremities: no pitting edema      Discharge Details        Discharge Medications        Continue These Medications        Instructions Start Date   Aspirin Low Dose 81 MG EC tablet  Generic drug: aspirin   81 mg, Oral, Daily      glimepiride 4 MG tablet  Commonly known as: AMARYL   4 mg, Oral, Every Morning Before Breakfast      Insulin Glargine 100 UNIT/ML injection pen  Commonly known as: LANTUS SOLOSTAR   20 Units, Subcutaneous, Nightly      Jardiance 25 MG tablet tablet  Generic drug: empagliflozin   25 mg, Oral, Daily      lansoprazole 30 MG capsule  Commonly known as: PREVACID   30 mg, Oral, Daily      lisinopril 10 MG tablet  Commonly known as: PRINIVIL,ZESTRIL   10 mg, Oral, Daily      metFORMIN 1000 MG tablet  Commonly known as: GLUCOPHAGE   1,000 mg, Oral, Every 12 Hours Scheduled      metoprolol succinate XL 25 MG 24 hr tablet  Commonly known as: TOPROL-XL   25 mg, Oral, Daily      rosuvastatin 40 MG tablet  Commonly known as: CRESTOR   40 mg, Oral, Daily      sildenafil 100 MG tablet  Commonly known as: VIAGRA   1 tablet, Oral, Daily PRN      valACYclovir 500 MG tablet  Commonly known as: VALTREX   500 mg, Oral, As Needed      Ventolin  (90 Base) MCG/ACT inhaler  Generic drug: albuterol sulfate HFA   1 puff, Inhalation, 4 Times Daily - RT               Allergies   Allergen Reactions    Semaglutide Nausea And Vomiting     Pancreatitis.    Codeine Unknown (See Comments)     'Feels ill'       Discharge Disposition:  Home or Self Care    Diet:  Hospital:  Diet Order   Procedures    Diet: Cardiac; Healthy Heart (2-3 Na+); Fluid Consistency: Thin (IDDSI 0)       Discharge Activity:       CODE STATUS:  Code Status and Medical Interventions:   Ordered  at: 03/17/24 0511     Level Of Support Discussed With:    Patient     Code Status (Patient has no pulse and is not breathing):    CPR (Attempt to Resuscitate)     Medical Interventions (Patient has pulse or is breathing):    Full Support         Future Appointments   Date Time Provider Department Center   6/13/2024  3:15 PM Bakari Coppola MD [facility]       Additional Instructions for the Follow-ups that You Need to Schedule       Discharge Follow-up with PCP   As directed       Currently Documented PCP:    Bakari Coppola MD    PCP Phone Number:    [phone]     Follow Up Details: one week        Discharge Follow-up with Specialty: cardiology; 3 Weeks   As directed      Specialty: cardiology   Follow Up: 3 Weeks                Pertinent  and/or Most Recent Results         LAB RESULTS:      Lab 03/17/24  0615 03/17/24  0011   WBC 5.46 6.40   HEMOGLOBIN 14.1 15.3   HEMATOCRIT 43.9 46.9   PLATELETS 164 198   NEUTROS ABS 2.44 2.96   IMMATURE GRANS (ABS) 0.04 0.03   LYMPHS ABS 2.15 2.49   MONOS ABS 0.54 0.65   EOS ABS 0.21 0.20   MCV 87.8 86.7         Lab 03/17/24  0615 03/17/24  0011   SODIUM 138 136   POTASSIUM 4.2 4.7   CHLORIDE 105 103   CO2 22.6 20.9*   ANION GAP 10.4 12.1   BUN 30* 28*   CREATININE 1.42* 1.33*   EGFR 57.6* 62.3   GLUCOSE 291* 286*   CALCIUM 8.4* 9.1   MAGNESIUM 2.0 2.4   PHOSPHORUS 4.4  --    HEMOGLOBIN A1C  --  11.80*         Lab 03/17/24  0615 03/17/24  0011   TOTAL PROTEIN 5.8* 6.6   ALBUMIN 3.4* 3.6   GLOBULIN 2.4 3.0   ALT (SGPT) 22 23   AST (SGOT) 22 20   BILIRUBIN 0.9 1.1   ALK PHOS 97 124*   LIPASE  --  46         Lab 03/17/24  0615 03/17/24  0207 03/17/24  0011   PROBNP  --   --  148.2   HSTROP T 23* 21 22*         Lab 03/17/24  0615   CHOLESTEROL 103   LDL CHOL 41   HDL CHOL 36*   TRIGLYCERIDES 156*             Brief Urine Lab Results  (Last result in the past 365 days)        Color   Clarity   Blood   Leuk Est   Nitrite   Protein   CREAT   Urine HCG        03/02/24 0892              35.2         03/02/24 0812 Yellow   Clear   Negative   Negative   Negative   30 mg/dL (1+)                 Microbiology Results (last 10 days)       ** No results found for the last 240 hours. **            XR Chest 1 View    Result Date: 3/17/2024   Low lung volumes with some mild bibasilar atelectasis.       LEON GALLOWAY MD       Electronically Signed and Approved By: LEON GALLOWAY MD on 3/17/2024 at 0:25                       Results for orders placed during the hospital encounter of 03/17/24    Adult Transthoracic Echo Complete W/ Cont if Necessary Per Protocol    Interpretation Summary    Left ventricular systolic function is normal. Calculated left ventricular EF = 56.2%    Left ventricular wall thickness is consistent with borderline concentric hypertrophy.    Left ventricular diastolic function is consistent with (grade I) impaired relaxation.    The left atrial cavity is mildly dilated.    No wall motion abnormalities            Time spent on Discharge including face to face service:  >30 minutes    Electronically signed by Sera Fox DO, 03/18/24, 5:50 PM EDT.

## 2024-03-18 NOTE — PROGRESS NOTES
[facility]     Cardiology Progress Note    Patient Name: Cisco Acevedo  : 1965  MRN: [id number]  Primary Care Physician:  Bakari Coppola MD  Date of admission: 3/17/2024    Subjective   Subjective     Chief Complaint: Chest pain    Interval HPI:    Patient is stable and without events overnight.     Review of Systems   All systems were reviewed and negative except for: Chest pain    Objective   Objective     Vitals:   Temp:  [97.9 °F (36.6 °C)-98.6 °F (37 °C)] 98.1 °F (36.7 °C)  Heart Rate:  [57-74] 74  Resp:  [16-18] 18  BP: (111-132)/(57-78) 131/74  Physical Exam      Neck. No jvd  Heart. Regular, no gallops, no rubs.   Lung. No rales,  no wheezing.   Abd. Soft, bs+  LE no edema  Neurologic. No motor deficits.     Scheduled Meds:aspirin, 81 mg, Oral, Daily  insulin regular, 2-7 Units, Subcutaneous, Q6H  sodium chloride, 10 mL, Intravenous, Q12H      Continuous Infusions:        Result Review    Result Review:  I have personally reviewed the results from the time of this admission to 3/18/2024 16:58 EDT and agree with these findings:  [x]  Laboratory  []  Microbiology  [x]  Radiology  [x]  EKG/Telemetry   [x]  Cardiology/Vascular   []  Pathology  []  Old records  []  Other:  Most notable findings include:     CBC          2023    08:08 3/2/2024    08:12 3/17/2024    00:11 3/17/2024    06:15   CBC   WBC 7.0     5.67  6.40  5.46    RBC 5.54     6.18  5.41  5.00    Hemoglobin 16.1     17.2  15.3  14.1    Hematocrit 47.5     52.7  46.9  43.9    MCV 86     85.3  86.7  87.8    MCH 29     27.8  28.3  28.2    MCHC 34     32.6  32.6  32.1    RDW 13     12.5  12.1  12.1    Platelets 199     214  198  164       Details          This result is from an external source.             CMP          3/2/2024    08:12 3/17/2024    00:11 3/17/2024    06:15   CMP   Glucose 313  286  291    BUN 26  28  30    Creatinine 1.46  1.33  1.42    EGFR 55.7  62.3  57.6    Sodium 142  136  138    Potassium 5.3  4.7  4.2    Chloride  103  103  105    Calcium 10.0  9.1  8.4    Total Protein 7.5  6.6  5.8    Albumin 4.3  3.6  3.4    Globulin 3.2  3.0  2.4    Total Bilirubin 1.1  1.1  0.9    Alkaline Phosphatase 125  124  97    AST (SGOT) 20  20  22    ALT (SGPT) 23  23  22    Albumin/Globulin Ratio 1.3  1.2  1.4    BUN/Creatinine Ratio 17.8  21.1  21.1    Anion Gap 13.7  12.1  10.4       CARDIAC LABS:      Lab 03/17/24  0615 03/17/24  0207 03/17/24  0011   PROBNP  --   --  148.2   HSTROP T 23* 21 22*        Assessment & Plan   Assessment / Plan     Brief Patient Summary:  Cisco Acevedo is a 58 y.o. male with known coronary artery disease admitted with chest discomfort.  He underwent stress test today which showed no evidence of ischemia.  The perfusion defect abnormality suggestive of diaphragmatic attenuation    Active Hospital Problems:  Active Hospital Problems    Diagnosis     **Chest pain     Coronary artery disease involving coronary bypass graft of native heart with angina pectoris            Plan:     I will optimize his medical therapy with beta-blockers, likely nitrate, bilateral for blood pressure control statin therapy.  Will follow-up as an outpatient.  No objection for discharge home from the cardiac point of view.        CODE STATUS:   Level Of Support Discussed With: Patient  Code Status (Patient has no pulse and is not breathing): CPR (Attempt to Resuscitate)  Medical Interventions (Patient has pulse or is breathing): Full Support      Electronically signed by Mac Rosa MD, 03/18/24, 4:58 PM EDT.

## 2024-03-18 NOTE — OUTREACH NOTE
Prep Survey      Flowsheet Row Responses   [facility] patient discharged from? [facility]   Is LACE score < 7 ? Yes   Eligibility [facility]   Date of Admission 03/17/24   Date of Discharge 03/18/24   Discharge Disposition Home or Self Care   Discharge diagnosis chest pain   Does the patient have one of the following disease processes/diagnoses(primary or secondary)? Other   Does the patient have Home health ordered? No   Is there a DME ordered? No   Prep survey completed? Yes            Rebecca MUÑOZ - Registered Nurse

## 2024-03-19 ENCOUNTER — TRANSITIONAL CARE MANAGEMENT TELEPHONE ENCOUNTER (OUTPATIENT)
Dept: CALL CENTER | Facility: HOSPITAL | Age: 59
End: 2024-03-19
Payer: COMMERCIAL

## 2024-03-19 NOTE — OUTREACH NOTE
Call Center TCM Note      Flowsheet Row Responses   [facility] patient discharged from? [facility]   Does the patient have one of the following disease processes/diagnoses(primary or secondary)? Other   TCM attempt successful? Yes  [verbal release for Estella Acevedo, sister]   Call start time 1338   Call end time 1346   Discharge diagnosis chest pain   Meds reviewed with patient/caregiver? Yes   Is the patient having any side effects they believe may be caused by any medication additions or changes? No   Does the patient have all medications ordered at discharge? N/A   Prescription comments No new medications added at discharge   Is the patient taking all medications as directed (includes completed medication regime)? Yes   Comments Hospital f/u on 3/25/24@1130am, Pt is to follow up with cardiology in 3-4 weeks,  discussed that he has never been called for appt after referral sent over from PCP office.  He reports at his last visit staff was going to check on status but has never been called, he prefers f/u with alternate provider other than one seen while admitted. Will send a note to PCP office   Does the patient have an appointment with their PCP within 7-14 days of discharge? Yes   Has home health visited the patient within 72 hours of discharge? N/A   Psychosocial issues? No   Did the patient receive a copy of their discharge instructions? Yes   Nursing interventions Reviewed instructions with patient   What is the patient's perception of their health status since discharge? Improving  [Pt reports he is doing good, denies chest pain at time of call and offers no complaints,  Reviewed cardiac s/s and need to seek care.  Pt does not have a BP cuff to monitor BP, reports BG this am 132.]   Is the patient/caregiver able to teach back signs and symptoms related to disease process for when to call PCP? Yes   Is the patient/caregiver able to teach back signs and symptoms related to disease process for when to call 911?  Yes   TCM call completed? Yes   Call end time [id number]            Sarah Elizalde, RN    3/19/2024, 13:53 EDT

## 2024-03-20 NOTE — PROGRESS NOTES
[facility] reached out to patient to offer appt next week and he states he is seeing someone else. He can discuss this topic with Dr Coppola when he comes in Monday.

## 2024-03-25 ENCOUNTER — TELEPHONE (OUTPATIENT)
Dept: INTERNAL MEDICINE | Age: 59
End: 2024-03-25

## 2024-03-25 NOTE — TELEPHONE ENCOUNTER
Patient called stating he would like a cardiology referral to go to this particular cardiologist.  Michelle Randolph M.D. Fax # [phone].  States he did not hear anything from the other referral and wants to go to Dr. Randolph.      [facility] Rep

## 2024-06-20 ENCOUNTER — OFFICE VISIT (OUTPATIENT)
Dept: INTERNAL MEDICINE | Age: 59
End: 2024-06-20
Payer: COMMERCIAL

## 2024-06-20 VITALS
HEIGHT: 70 IN | BODY MASS INDEX: 40.43 KG/M2 | SYSTOLIC BLOOD PRESSURE: 110 MMHG | TEMPERATURE: 97.7 F | WEIGHT: 282.4 LBS | HEART RATE: 67 BPM | OXYGEN SATURATION: 96 % | DIASTOLIC BLOOD PRESSURE: 80 MMHG

## 2024-06-20 DIAGNOSIS — E78.2 MIXED HYPERLIPIDEMIA: ICD-10-CM

## 2024-06-20 DIAGNOSIS — I10 PRIMARY HYPERTENSION: ICD-10-CM

## 2024-06-20 DIAGNOSIS — N18.31 STAGE 3A CHRONIC KIDNEY DISEASE: ICD-10-CM

## 2024-06-20 DIAGNOSIS — I25.709 CORONARY ARTERY DISEASE INVOLVING CORONARY BYPASS GRAFT OF NATIVE HEART WITH ANGINA PECTORIS: ICD-10-CM

## 2024-06-20 DIAGNOSIS — J45.20 MILD INTERMITTENT ASTHMA WITHOUT COMPLICATION: ICD-10-CM

## 2024-06-20 DIAGNOSIS — E11.9 TYPE 2 DIABETES MELLITUS WITHOUT COMPLICATION, WITHOUT LONG-TERM CURRENT USE OF INSULIN: Primary | ICD-10-CM

## 2024-06-20 PROCEDURE — 99214 OFFICE O/P EST MOD 30 MIN: CPT | Performed by: INTERNAL MEDICINE

## 2024-06-20 RX ORDER — METOPROLOL SUCCINATE 25 MG/1
25 TABLET, EXTENDED RELEASE ORAL DAILY
Qty: 90 TABLET | Refills: 1 | Status: SHIPPED | OUTPATIENT
Start: 2024-06-20

## 2024-06-20 RX ORDER — ACETAZOLAMIDE 125 MG/1
125 TABLET ORAL 2 TIMES DAILY
Qty: 20 TABLET | Refills: 1 | Status: SHIPPED | OUTPATIENT
Start: 2024-06-20 | End: 2024-06-20

## 2024-06-20 RX ORDER — FLURBIPROFEN SODIUM 0.3 MG/ML
1 SOLUTION/ DROPS OPHTHALMIC DAILY
Qty: 90 EACH | Refills: 1 | Status: SHIPPED | OUTPATIENT
Start: 2024-06-20

## 2024-06-20 RX ORDER — ACETAZOLAMIDE 125 MG/1
125 TABLET ORAL 2 TIMES DAILY
Qty: 20 TABLET | Refills: 1 | Status: SHIPPED | OUTPATIENT
Start: 2024-06-20

## 2024-06-20 RX ORDER — GLIMEPIRIDE 4 MG/1
8 TABLET ORAL
Qty: 180 TABLET | Refills: 1 | Status: SHIPPED | OUTPATIENT
Start: 2024-06-20

## 2024-06-20 RX ORDER — MONTELUKAST SODIUM 10 MG/1
10 TABLET ORAL NIGHTLY
Qty: 30 TABLET | Refills: 1 | Status: SHIPPED | OUTPATIENT
Start: 2024-06-20

## 2024-06-20 RX ORDER — EMPAGLIFLOZIN 25 MG/1
25 TABLET, FILM COATED ORAL DAILY
Qty: 90 TABLET | Refills: 1 | Status: SHIPPED | OUTPATIENT
Start: 2024-06-20

## 2024-06-20 RX ORDER — ASPIRIN 81 MG/1
81 TABLET, COATED ORAL DAILY
Qty: 90 TABLET | Refills: 1 | Status: SHIPPED | OUTPATIENT
Start: 2024-06-20

## 2024-06-20 RX ORDER — LANSOPRAZOLE 30 MG/1
30 CAPSULE, DELAYED RELEASE ORAL DAILY
Qty: 90 CAPSULE | Refills: 1 | Status: SHIPPED | OUTPATIENT
Start: 2024-06-20

## 2024-06-20 RX ORDER — LISINOPRIL 10 MG/1
10 TABLET ORAL DAILY
Qty: 90 TABLET | Refills: 1 | Status: SHIPPED | OUTPATIENT
Start: 2024-06-20

## 2024-06-20 RX ORDER — VALACYCLOVIR HYDROCHLORIDE 500 MG/1
500 TABLET, FILM COATED ORAL AS NEEDED
Qty: 30 TABLET | Refills: 1 | Status: SHIPPED | OUTPATIENT
Start: 2024-06-20

## 2024-06-20 RX ORDER — ROSUVASTATIN CALCIUM 40 MG/1
40 TABLET, COATED ORAL DAILY
Qty: 90 TABLET | Refills: 1 | Status: SHIPPED | OUTPATIENT
Start: 2024-06-20

## 2024-06-20 NOTE — ASSESSMENT & PLAN NOTE
Patient has had a little flareup of this here lately, he was in the urgent care wondered if he had strep, it was related to drainage.  He is using Zyrtec over-the-counter, it is calming things down some, but still feels like he is going to have a asthma attack come on.  If it does he does have albuterol available.  We will give him a trial of Singulair, if helpful patient to call and I will send it to his mail order.

## 2024-06-20 NOTE — ASSESSMENT & PLAN NOTE
Patient was hospitalized overnight for episode of chest pain back in 3/24.  His troponin was flat overnight, he underwent stress imaging study in the morning with results as noted above.  Patient was maintained on low-dose beta-blocker and aspirin for antianginal benefit.  His LDL is addressed as per the heading below.

## 2024-06-20 NOTE — ASSESSMENT & PLAN NOTE
Patient's GFR was stable in the upper 50s during his brief hospital stay in 3/24.  Of note his potassium was well normal at 4.2, so the 5.3 reading that we got earlier in the year was secondary to hemolysis.  He is on low-dose lisinopril, stable to continue same.

## 2024-06-20 NOTE — ASSESSMENT & PLAN NOTE
LDL was down from 168 to 41 at the time of his hospitalization for chest pain in 3/24.  He had just gotten back on the Crestor for just a couple of weeks prior to that.  Certainly excellent response, patient stable to continue with that dosing.

## 2024-06-20 NOTE — ASSESSMENT & PLAN NOTE
Patient's A1c was stuck at 11.8 when he was the hospital in 3/24.  He had not been able to get on the insulin therapy just yet.  He is currently maxed out with metformin and Jardiance and should continue same.  He is on moderate dose glimepiride and we will refill that medicine as well.  Current Lantus dose is 24 units daily.  Will get A1c here very shortly and make further recommendations after that.

## 2024-06-20 NOTE — ASSESSMENT & PLAN NOTE
Blood pressure is well-controlled his pulse is in the mid 60s as of his 6/24 OV.  He is on just low doses of lisinopril and metoprolol, stable to continue same.

## 2024-06-20 NOTE — PROGRESS NOTES
"Chief Complaint  Diabetes, Follow-up (Pt states that this is routine, he didn't have labs. /He is going to [place] and is wanting a high altitude medicine prescribed.), and Allergies (Pt states that he has a sore throat from the drainage. He states that it makes him feel that he is going to have an asthma attack. )    Subjective      Cisco Acevedo presents to [facility]    Diabetes  Pertinent negatives for hypoglycemia include no confusion. Pertinent negatives for diabetes include no blurred vision, no chest pain and no fatigue.   Allergies  Pertinent negatives include no abdominal pain, arthralgias, chest pain, congestion, coughing, fatigue or fever.     History of present illness:  Patient is a 58-year-old male with underlying DM, HTN, HLD, resultant CAD, morbid obesity, among others, seen in 1/24 as a New Patient, and who is coming back now 6/24 for routine 3-month follow-up.  We will review his med list, go over any recent labs available, address his care gaps, and make further recommendations at that time.    Review of Systems   Constitutional:  Negative for appetite change, fatigue and fever.   HENT:  Negative for congestion and ear pain.    Eyes:  Negative for blurred vision.   Respiratory:  Negative for cough, chest tightness, shortness of breath and wheezing.    Cardiovascular:  Negative for chest pain, palpitations and leg swelling.   Gastrointestinal:  Negative for abdominal pain.   Genitourinary:  Negative for difficulty urinating, dysuria and hematuria.   Musculoskeletal:  Negative for arthralgias and gait problem.   Skin:  Negative for skin lesions.   Neurological:  Negative for syncope, memory problem and confusion.   Psychiatric/Behavioral:  Negative for self-injury and depressed mood.        Objective   Vital Signs:   /80   Pulse 67   Temp 97.7 °F (36.5 °C) (Skin)   Ht 177.8 cm (70\")   Wt 128 kg (282 lb 6.4 oz)   SpO2 96%   BMI 40.52 kg/m²         Physical " Exam  Vitals and nursing note reviewed.   Constitutional:       General: He is not in acute distress.     Appearance: Normal appearance. He is not toxic-appearing.   HENT:      Head: Atraumatic.      Right Ear: External ear normal.      Left Ear: External ear normal.      Nose: Nose normal.      Mouth/Throat:      Mouth: Mucous membranes are moist.   Eyes:      General:         Right eye: No discharge.         Left eye: No discharge.      Extraocular Movements: Extraocular movements intact.      Pupils: Pupils are equal, round, and reactive to light.   Neck:      Comments: No carotid bruit.  Cardiovascular:      Rate and Rhythm: Normal rate and regular rhythm.      Pulses: Normal pulses.      Heart sounds: Normal heart sounds. No murmur heard.     No gallop.      Comments: Heart tones normal, no ectopy, no S3.  Pulmonary:      Effort: Pulmonary effort is normal. No respiratory distress.      Breath sounds: No wheezing, rhonchi or rales.      Comments: Lung fields clear bilaterally.  Abdominal:      General: There is no distension.      Palpations: Abdomen is soft. There is no mass.      Tenderness: There is no abdominal tenderness. There is no guarding.      Comments: Positive liver edge.   Musculoskeletal:         General: No swelling or tenderness.      Cervical back: No tenderness.      Right lower leg: No edema.      Left lower leg: No edema.      Comments: Trace pretibial edema.   Skin:     General: Skin is warm and dry.      Findings: No rash.   Neurological:      General: No focal deficit present.      Mental Status: He is alert and oriented to person, place, and time. Mental status is at baseline.      Motor: No weakness.      Gait: Gait normal.   Psychiatric:         Mood and Affect: Mood normal.         Thought Content: Thought content normal.          Result Review   The following data was reviewed by: Bakari Coppola MD on 01/23/2024:  [x] Laboratory  [] Microbiology  [] Radiology  [] EKG/telemetry  []  Cardiology/Vascular  [] Pathology  [x] Old records             Assessment and Plan   Diagnoses and all orders for this visit:    1. Type 2 diabetes mellitus without complication, without long-term current use of insulin (Primary)  Overview:  Patient diagnosed as diabetic at least in his mid 30s, was initially on insulin, and transition to oral agents.    Patient failed semaglutide secondary to episode of pancreatitis.    Assessment & Plan:  Patient's A1c was stuck at 11.8 when he was the hospital in 3/24.  He had not been able to get on the insulin therapy just yet.  He is currently maxed out with metformin and Jardiance and should continue same.  He is on moderate dose glimepiride and we will refill that medicine as well.  Current Lantus dose is 24 units daily.  Will get A1c here very shortly and make further recommendations after that.      2. Stage 3a chronic kidney disease  Assessment & Plan:  Patient's GFR was stable in the upper 50s during his brief hospital stay in 3/24.  Of note his potassium was well normal at 4.2, so the 5.3 reading that we got earlier in the year was secondary to hemolysis.  He is on low-dose lisinopril, stable to continue same.      3. Primary hypertension  Assessment & Plan:  Blood pressure is well-controlled his pulse is in the mid 60s as of his 6/24 OV.  He is on just low doses of lisinopril and metoprolol, stable to continue same.      4. Mixed hyperlipidemia  Assessment & Plan:  LDL was down from 168 to 41 at the time of his hospitalization for chest pain in 3/24.  He had just gotten back on the Crestor for just a couple of weeks prior to that.  Certainly excellent response, patient stable to continue with that dosing.      5. Coronary artery disease involving coronary bypass graft of native heart with angina pectoris  Overview:  SPECT 3/24:    Myocardial perfusion imaging indicates a normal myocardial perfusion study with no evidence of ischemia. Impressions are consistent with a  low risk study.    Left ventricular ejection fraction is normal (Calculated EF = 64%).    Low risk for ischemic heart disease.    Diaphragmatic attenuation artifact is present.    CTA coronaries 7/21:  1. Abnormal coronary arteries within limitations of CT technique.   2. 50-70% stenosis of the proximal RCA and 25-50% stenosis of the LAD.    Uninterpretable stenosis of the OM and mid RCA.   3. Calcification in the ascending aorta.    4. Normal pericardium and cardiac morphology within limitations of CT   technique.     Assessment & Plan:  Patient was hospitalized overnight for episode of chest pain back in 3/24.  His troponin was flat overnight, he underwent stress imaging study in the morning with results as noted above.  Patient was maintained on low-dose beta-blocker and aspirin for antianginal benefit.  His LDL is addressed as per the heading below.      6. Mild intermittent asthma without complication  Assessment & Plan:  Patient has had a little flareup of this here lately, he was in the urgent care wondered if he had strep, it was related to drainage.  He is using Zyrtec over-the-counter, it is calming things down some, but still feels like he is going to have a asthma attack come on.  If it does he does have albuterol available.  We will give him a trial of Singulair, if helpful patient to call and I will send it to his mail order.          Other orders  -     Aspirin Low Dose 81 MG EC tablet; Take 1 tablet by mouth Daily.  Dispense: 90 tablet; Refill: 1  -     glimepiride (AMARYL) 4 MG tablet; Take 2 tablets by mouth Every Morning Before Breakfast.  Dispense: 180 tablet; Refill: 1  -     Insulin Glargine (LANTUS SOLOSTAR) 100 UNIT/ML injection pen; Inject 25 Units under the skin into the appropriate area as directed Every Night.  Dispense: 30 mL; Refill: 1  -     B-D UF III MINI PEN NEEDLES 31G X 5 MM misc; 1 each by Other route Daily.  Dispense: 90 each; Refill: 1  -     Jardiance 25 MG tablet tablet; Take  1 tablet by mouth Daily.  Dispense: 90 tablet; Refill: 1  -     lansoprazole (PREVACID) 30 MG capsule; Take 1 capsule by mouth Daily.  Dispense: 90 capsule; Refill: 1  -     lisinopril (PRINIVIL,ZESTRIL) 10 MG tablet; Take 1 tablet by mouth Daily.  Dispense: 90 tablet; Refill: 1  -     metFORMIN (GLUCOPHAGE) 1000 MG tablet; Take 1 tablet by mouth Every 12 (Twelve) Hours.  Dispense: 360 tablet; Refill: 1  -     metoprolol succinate XL (TOPROL-XL) 25 MG 24 hr tablet; Take 1 tablet by mouth Daily.  Dispense: 90 tablet; Refill: 1  -     rosuvastatin (CRESTOR) 40 MG tablet; Take 1 tablet by mouth Daily.  Dispense: 90 tablet; Refill: 1  -     valACYclovir (VALTREX) 500 MG tablet; Take 1 tablet by mouth As Needed (frequent eye infections).  Dispense: 30 tablet; Refill: 1  -     Discontinue: acetaZOLAMIDE (DIAMOX) 125 MG tablet; Take 1 tablet by mouth 2 (Two) Times a Day. Begin 24 hours before ascent and discontinue 48 hours after.  Dispense: 20 tablet; Refill: 1  -     acetaZOLAMIDE (DIAMOX) 125 MG tablet; Take 1 tablet by mouth 2 (Two) Times a Day. Begin 24 hours before ascent and discontinue 48 hours after.  Dispense: 20 tablet; Refill: 1  -     montelukast (Singulair) 10 MG tablet; Take 1 tablet by mouth Every Night.  Dispense: 30 tablet; Refill: 1            Class 2 Severe Obesity (BMI >=35 and <=39.9). Obesity-related health conditions include the following: coronary heart disease, diabetes mellitus, dyslipidemias, and osteoarthritis. Obesity is unchanged. BMI is is above average; BMI management plan is completed. We discussed portion control and increasing exercise.    Follow Up   Return in about 3 months (around 9/20/2024).  Patient was given instructions and counseling regarding his condition or for health maintenance advice. Please see specific information pulled into the AVS if appropriate.     Total Time Spent:   minutes     This time includes time spent by me in the following activities: preparing for the  visit, reviewing extensive past medical history and tests, performing a medically appropriate examination and/or evaluation, counseling and educating the patient and/or caregivers, ordering medications, tests, or procedures, referring and/or communicating with other health care professionals and documenting information in the medical record all on this date of service.

## 2024-06-20 NOTE — PATIENT INSTRUCTIONS
You still have labs, they can be done nonfasting this time, with a due date of 6/5/2024.  He can have these drawn at any time, please contact the office the day after so we can be looking for them.    2.  I also put in an order for a vitamin D, it has a due date of 6/25/2024, please mention this to the  when you are there.    3.  Lastly, please call a week or so before your 3-month follow-up, so we can put labs in for that appointment.

## 2024-07-22 ENCOUNTER — TELEPHONE (OUTPATIENT)
Dept: INTERNAL MEDICINE | Age: 59
End: 2024-07-22
Payer: COMMERCIAL

## 2024-07-26 ENCOUNTER — TELEPHONE (OUTPATIENT)
Dept: INTERNAL MEDICINE | Age: 59
End: 2024-07-26
Payer: COMMERCIAL

## 2024-07-26 DIAGNOSIS — H44.003 EYE INFECTION, BILATERAL: Primary | ICD-10-CM

## 2024-07-26 RX ORDER — VALACYCLOVIR HYDROCHLORIDE 500 MG/1
500 TABLET, FILM COATED ORAL AS NEEDED
Qty: 30 TABLET | Refills: 1 | Status: SHIPPED | OUTPATIENT
Start: 2024-07-26

## 2024-08-15 ENCOUNTER — TELEPHONE (OUTPATIENT)
Dept: INTERNAL MEDICINE | Age: 59
End: 2024-08-15
Payer: COMMERCIAL

## 2024-08-15 DIAGNOSIS — H44.003 EYE INFECTION, BILATERAL: ICD-10-CM

## 2024-08-15 RX ORDER — VALACYCLOVIR HYDROCHLORIDE 500 MG/1
500 TABLET, FILM COATED ORAL 2 TIMES DAILY
Qty: 30 TABLET | Refills: 1 | Status: SHIPPED | OUTPATIENT
Start: 2024-08-15

## 2024-08-15 NOTE — TELEPHONE ENCOUNTER
Correction on Sig  Mail order received the RX with no proper sig on.     I am re-sending it after speaking to PT. ( FYI), he takes it BID

## 2024-09-16 ENCOUNTER — LAB (OUTPATIENT)
Dept: LAB | Facility: HOSPITAL | Age: 59
End: 2024-09-16
Payer: COMMERCIAL

## 2024-09-16 DIAGNOSIS — D75.1 POLYCYTHEMIA: ICD-10-CM

## 2024-09-16 DIAGNOSIS — E55.9 VITAMIN D DEFICIENCY: ICD-10-CM

## 2024-09-16 DIAGNOSIS — E11.9 TYPE 2 DIABETES MELLITUS WITHOUT COMPLICATION, WITHOUT LONG-TERM CURRENT USE OF INSULIN: ICD-10-CM

## 2024-09-16 DIAGNOSIS — N18.31 STAGE 3A CHRONIC KIDNEY DISEASE: ICD-10-CM

## 2024-09-16 LAB
25(OH)D3 SERPL-MCNC: 43.5 NG/ML (ref 30–100)
ALBUMIN SERPL-MCNC: 4 G/DL (ref 3.5–5.2)
ALBUMIN/GLOB SERPL: 1.5 G/DL
ALP SERPL-CCNC: 135 U/L (ref 39–117)
ALT SERPL W P-5'-P-CCNC: 22 U/L (ref 1–41)
ANION GAP SERPL CALCULATED.3IONS-SCNC: 11.3 MMOL/L (ref 5–15)
AST SERPL-CCNC: 21 U/L (ref 1–40)
BASOPHILS # BLD AUTO: 0.06 10*3/MM3 (ref 0–0.2)
BASOPHILS NFR BLD AUTO: 1 % (ref 0–1.5)
BILIRUB SERPL-MCNC: 1.1 MG/DL (ref 0–1.2)
BUN SERPL-MCNC: 22 MG/DL (ref 6–20)
BUN/CREAT SERPL: 15.9 (ref 7–25)
CALCIUM SPEC-SCNC: 9.4 MG/DL (ref 8.6–10.5)
CHLORIDE SERPL-SCNC: 106 MMOL/L (ref 98–107)
CO2 SERPL-SCNC: 24.7 MMOL/L (ref 22–29)
CREAT SERPL-MCNC: 1.38 MG/DL (ref 0.76–1.27)
DEPRECATED RDW RBC AUTO: 41.4 FL (ref 37–54)
EGFRCR SERPLBLD CKD-EPI 2021: 59.3 ML/MIN/1.73
EOSINOPHIL # BLD AUTO: 0.18 10*3/MM3 (ref 0–0.4)
EOSINOPHIL NFR BLD AUTO: 3.1 % (ref 0.3–6.2)
ERYTHROCYTE [DISTWIDTH] IN BLOOD BY AUTOMATED COUNT: 13.4 % (ref 12.3–15.4)
FERRITIN SERPL-MCNC: 81.6 NG/ML (ref 30–400)
GLOBULIN UR ELPH-MCNC: 2.6 GM/DL
GLUCOSE SERPL-MCNC: 289 MG/DL (ref 65–99)
HBA1C MFR BLD: 12.8 % (ref 4.8–5.6)
HCT VFR BLD AUTO: 48.5 % (ref 37.5–51)
HGB BLD-MCNC: 16.3 G/DL (ref 13–17.7)
IMM GRANULOCYTES # BLD AUTO: 0.03 10*3/MM3 (ref 0–0.05)
IMM GRANULOCYTES NFR BLD AUTO: 0.5 % (ref 0–0.5)
IRON 24H UR-MRATE: 73 MCG/DL (ref 59–158)
IRON SATN MFR SERPL: 19 % (ref 20–50)
LYMPHOCYTES # BLD AUTO: 1.66 10*3/MM3 (ref 0.7–3.1)
LYMPHOCYTES NFR BLD AUTO: 28.6 % (ref 19.6–45.3)
MCH RBC QN AUTO: 28.8 PG (ref 26.6–33)
MCHC RBC AUTO-ENTMCNC: 33.6 G/DL (ref 31.5–35.7)
MCV RBC AUTO: 85.8 FL (ref 79–97)
MONOCYTES # BLD AUTO: 0.62 10*3/MM3 (ref 0.1–0.9)
MONOCYTES NFR BLD AUTO: 10.7 % (ref 5–12)
NEUTROPHILS NFR BLD AUTO: 3.25 10*3/MM3 (ref 1.7–7)
NEUTROPHILS NFR BLD AUTO: 56.1 % (ref 42.7–76)
NRBC BLD AUTO-RTO: 0 /100 WBC (ref 0–0.2)
PLATELET # BLD AUTO: 201 10*3/MM3 (ref 140–450)
PMV BLD AUTO: 10.1 FL (ref 6–12)
POTASSIUM SERPL-SCNC: 4.7 MMOL/L (ref 3.5–5.2)
PROT SERPL-MCNC: 6.6 G/DL (ref 6–8.5)
RBC # BLD AUTO: 5.65 10*6/MM3 (ref 4.14–5.8)
SODIUM SERPL-SCNC: 142 MMOL/L (ref 136–145)
TIBC SERPL-MCNC: 387 MCG/DL (ref 298–536)
TRANSFERRIN SERPL-MCNC: 260 MG/DL (ref 200–360)
WBC NRBC COR # BLD AUTO: 5.8 10*3/MM3 (ref 3.4–10.8)

## 2024-09-16 PROCEDURE — 80053 COMPREHEN METABOLIC PANEL: CPT

## 2024-09-16 PROCEDURE — 83540 ASSAY OF IRON: CPT

## 2024-09-16 PROCEDURE — 36415 COLL VENOUS BLD VENIPUNCTURE: CPT

## 2024-09-16 PROCEDURE — 84681 ASSAY OF C-PEPTIDE: CPT

## 2024-09-16 PROCEDURE — 84466 ASSAY OF TRANSFERRIN: CPT

## 2024-09-16 PROCEDURE — 85025 COMPLETE CBC W/AUTO DIFF WBC: CPT

## 2024-09-16 PROCEDURE — 82306 VITAMIN D 25 HYDROXY: CPT

## 2024-09-16 PROCEDURE — 82728 ASSAY OF FERRITIN: CPT

## 2024-09-16 PROCEDURE — 83036 HEMOGLOBIN GLYCOSYLATED A1C: CPT

## 2024-09-17 LAB — C PEPTIDE SERPL-MCNC: 2.8 NG/ML (ref 1.1–4.4)

## 2024-10-09 ENCOUNTER — OFFICE VISIT (OUTPATIENT)
Dept: INTERNAL MEDICINE | Age: 59
End: 2024-10-09
Payer: COMMERCIAL

## 2024-10-09 VITALS
HEART RATE: 89 BPM | WEIGHT: 282.4 LBS | BODY MASS INDEX: 40.43 KG/M2 | SYSTOLIC BLOOD PRESSURE: 110 MMHG | HEIGHT: 70 IN | OXYGEN SATURATION: 96 % | TEMPERATURE: 97.5 F | DIASTOLIC BLOOD PRESSURE: 68 MMHG

## 2024-10-09 DIAGNOSIS — Z23 NEED FOR VACCINATION: ICD-10-CM

## 2024-10-09 DIAGNOSIS — H44.003 EYE INFECTION, BILATERAL: ICD-10-CM

## 2024-10-09 DIAGNOSIS — E55.9 VITAMIN D DEFICIENCY: ICD-10-CM

## 2024-10-09 DIAGNOSIS — G89.29 CHRONIC PAIN OF LEFT KNEE: ICD-10-CM

## 2024-10-09 DIAGNOSIS — N18.31 STAGE 3A CHRONIC KIDNEY DISEASE: ICD-10-CM

## 2024-10-09 DIAGNOSIS — E78.2 MIXED HYPERLIPIDEMIA: ICD-10-CM

## 2024-10-09 DIAGNOSIS — M25.562 CHRONIC PAIN OF LEFT KNEE: ICD-10-CM

## 2024-10-09 DIAGNOSIS — H61.21 IMPACTED CERUMEN OF RIGHT EAR: ICD-10-CM

## 2024-10-09 DIAGNOSIS — I10 PRIMARY HYPERTENSION: ICD-10-CM

## 2024-10-09 DIAGNOSIS — E11.65 TYPE 2 DIABETES MELLITUS WITH HYPERGLYCEMIA, WITHOUT LONG-TERM CURRENT USE OF INSULIN: Primary | ICD-10-CM

## 2024-10-09 PROCEDURE — 90471 IMMUNIZATION ADMIN: CPT | Performed by: INTERNAL MEDICINE

## 2024-10-09 PROCEDURE — 90656 IIV3 VACC NO PRSV 0.5 ML IM: CPT | Performed by: INTERNAL MEDICINE

## 2024-10-09 PROCEDURE — 99214 OFFICE O/P EST MOD 30 MIN: CPT | Performed by: INTERNAL MEDICINE

## 2024-10-09 RX ORDER — VALACYCLOVIR HYDROCHLORIDE 500 MG/1
500 TABLET, FILM COATED ORAL 2 TIMES DAILY PRN
Qty: 30 TABLET | Refills: 1 | Status: SHIPPED | OUTPATIENT
Start: 2024-10-09

## 2024-10-09 RX ORDER — LISINOPRIL 10 MG/1
10 TABLET ORAL DAILY
Qty: 90 TABLET | Refills: 1 | Status: SHIPPED | OUTPATIENT
Start: 2024-10-09

## 2024-10-09 RX ORDER — EMPAGLIFLOZIN 25 MG/1
25 TABLET, FILM COATED ORAL DAILY
Qty: 90 TABLET | Refills: 1 | Status: SHIPPED | OUTPATIENT
Start: 2024-10-09

## 2024-10-09 RX ORDER — ROSUVASTATIN CALCIUM 40 MG/1
40 TABLET, COATED ORAL DAILY
Qty: 90 TABLET | Refills: 1 | Status: SHIPPED | OUTPATIENT
Start: 2024-10-09

## 2024-10-09 RX ORDER — LANSOPRAZOLE 30 MG/1
30 CAPSULE, DELAYED RELEASE ORAL DAILY
Qty: 90 CAPSULE | Refills: 1 | Status: SHIPPED | OUTPATIENT
Start: 2024-10-09

## 2024-10-09 RX ORDER — GLIMEPIRIDE 4 MG/1
8 TABLET ORAL
Qty: 180 TABLET | Refills: 1 | Status: SHIPPED | OUTPATIENT
Start: 2024-10-09

## 2024-10-09 RX ORDER — ASPIRIN 81 MG/1
81 TABLET ORAL DAILY
Qty: 90 TABLET | Refills: 1 | Status: SHIPPED | OUTPATIENT
Start: 2024-10-09

## 2024-10-09 RX ORDER — FLURBIPROFEN SODIUM 0.3 MG/ML
1 SOLUTION/ DROPS OPHTHALMIC DAILY
Qty: 90 EACH | Refills: 1 | Status: SHIPPED | OUTPATIENT
Start: 2024-10-09

## 2024-10-09 RX ORDER — METOPROLOL SUCCINATE 25 MG/1
25 TABLET, EXTENDED RELEASE ORAL DAILY
Qty: 90 TABLET | Refills: 1 | Status: SHIPPED | OUTPATIENT
Start: 2024-10-09

## 2024-10-09 NOTE — ASSESSMENT & PLAN NOTE
Vitamin D is up from 22 to 43 as of his 10/24 OV.  He is just on low-dose over-the-counter supplementation, stable to continue same.

## 2024-10-09 NOTE — PROGRESS NOTES
Chief Complaint  Diabetes, Follow-up (Pt had labs, He states that this is routine. ), Pain (Left knee pain, when putting pressure on it like climbing stairs/), and Bilateral ears clogged (He has been sick three times this summer, he feels it may be allergies but his ears are still clogged it seems. )    Subjective      Cisco Acevedo presents to [facility]    Diabetes  Pertinent negatives for hypoglycemia include no confusion. Pertinent negatives for diabetes include no blurred vision, no chest pain and no fatigue.   Allergies  Pertinent negatives include no abdominal pain, arthralgias, chest pain, congestion, coughing, fatigue or fever.   Follow-upPertinent negatives include no chest pain, no confusion, no palpitations, no shortness of breath, no fatigue, no blurred vision, no wheezing, no abdominal pain, no cough and no depressed mood.   Pain  Pertinent negatives include no abdominal pain, arthralgias, chest pain, congestion, coughing, fatigue or fever.     History of present illness:  Patient is a 59-year-old male with underlying DM, HTN, HLD, resultant CAD, morbid obesity, among others, seen in 1/24 as a New Patient, and who is coming back now 10/24 for routine 3-4-month follow-up.  We will review his med list, go over any recent labs available, address his care gaps, and make further recommendations at that time.    Review of Systems   Constitutional:  Negative for appetite change, fatigue and fever.   HENT:  Negative for congestion and ear pain.    Eyes:  Negative for blurred vision.   Respiratory:  Negative for cough, chest tightness, shortness of breath and wheezing.    Cardiovascular:  Negative for chest pain, palpitations and leg swelling.   Gastrointestinal:  Negative for abdominal pain.   Genitourinary:  Negative for difficulty urinating, dysuria and hematuria.   Musculoskeletal:  Negative for arthralgias and gait problem.   Skin:  Negative for skin lesions.  "  Neurological:  Negative for syncope, memory problem and confusion.   Psychiatric/Behavioral:  Negative for self-injury and depressed mood.        Objective   Vital Signs:   /68   Pulse 89   Temp 97.5 °F (36.4 °C) (Skin)   Ht 177.8 cm (70\")   Wt 128 kg (282 lb 6.4 oz)   SpO2 96%   BMI 40.52 kg/m²         Physical Exam  Vitals and nursing note reviewed.   Constitutional:       General: He is not in acute distress.     Appearance: Normal appearance. He is not toxic-appearing.   HENT:      Head: Atraumatic.      Right Ear: External ear normal.      Left Ear: External ear normal.      Nose: Nose normal.      Mouth/Throat:      Mouth: Mucous membranes are moist.   Eyes:      General:         Right eye: No discharge.         Left eye: No discharge.      Extraocular Movements: Extraocular movements intact.      Pupils: Pupils are equal, round, and reactive to light.   Neck:      Comments: No carotid bruit.  Cardiovascular:      Rate and Rhythm: Normal rate and regular rhythm.      Pulses: Normal pulses.      Heart sounds: Normal heart sounds. No murmur heard.     No gallop.      Comments: Heart tones normal, no ectopy, no S3.  Pulmonary:      Effort: Pulmonary effort is normal. No respiratory distress.      Breath sounds: No wheezing, rhonchi or rales.      Comments: Lung fields clear bilaterally.  Abdominal:      General: There is no distension.      Palpations: Abdomen is soft. There is no mass.      Tenderness: There is no abdominal tenderness. There is no guarding.      Comments: Positive liver edge.   Musculoskeletal:         General: No swelling or tenderness.      Cervical back: No tenderness.      Right lower leg: No edema.      Left lower leg: No edema.      Comments: Trace pretibial edema.   Skin:     General: Skin is warm and dry.      Findings: No rash.   Neurological:      General: No focal deficit present.      Mental Status: He is alert and oriented to person, place, and time. Mental status is " at baseline.      Motor: No weakness.      Gait: Gait normal.   Psychiatric:         Mood and Affect: Mood normal.         Thought Content: Thought content normal.          Result Review   The following data was reviewed by: Bakari Coppola MD on 01/23/2024:  [x] Laboratory  [] Microbiology  [] Radiology  [] EKG/telemetry  [] Cardiology/Vascular  [] Pathology  [x] Old records             Assessment and Plan   Diagnoses and all orders for this visit:    1. Type 2 diabetes mellitus with hyperglycemia, without long-term current use of insulin (Primary)  Overview:  Patient diagnosed as diabetic at least in his mid 30s, was initially on insulin, and transition to oral agents.    Patient failed semaglutide secondary to episode of pancreatitis.    C-peptide is 2.8.    Assessment & Plan:  Patient's A1c is 12.8 as of his 10/24 OV.  He is maxed out on metformin, Jardiance, and glimepiride.  His C-peptide is still in the normal range, so therapeutically he should be benefiting from the glimepiride.    He is only on 25 units of Lantus at night, discussed with patient he needs to start increasing this by 5 units every night until his morning sugars are in the 150 ballpark.  His fasting sugar was in the 290 ballpark.  Will need close follow-up of labs, if A1c remains elevated despite adequate fasting sugars, patient will need to be on mealtime insulin as well.    Orders:  -     Hemoglobin A1c; Future    2. Need for vaccination  -     Fluzone >6mos ([id number])    3. Eye infection, bilateral  -     valACYclovir (VALTREX) 500 MG tablet; Take 1 tablet by mouth 2 (Two) Times a Day As Needed (Outbreak.).  Dispense: 30 tablet; Refill: 1    4. Stage 3a chronic kidney disease  Assessment & Plan:  GFR 59 is certainly stable as of his 10/24 OV.  His electrolytes are fine, no acidosis, has low-dose ACEI on board, stable to continue same.    Of note he does have some mild to moderate proteinuria, we will need to titrate his lisinopril dose as  tolerates if this does not improve with control of his sugars.    Orders:  -     Comprehensive Metabolic Panel; Future    5. Primary hypertension  Assessment & Plan:  Blood pressure stable and his pulse is in the 80s as of his 10/24 OV.  He is on low doses of lisinopril and metoprolol, stable to continue same.      6. Mixed hyperlipidemia  -     Lipid Panel; Future    7. Vitamin D deficiency  Assessment & Plan:  Vitamin D is up from 22 to 43 as of his 10/24 OV.  He is just on low-dose over-the-counter supplementation, stable to continue same.      8. Chronic pain of left knee  -     Ambulatory Referral to Orthopedic Surgery    9. Impacted cerumen of right ear  Assessment & Plan:  This is as of his 10/24 OV.  Been bothering him quite a bit this summer, thought it was possibly allergies.  He does have significant impaction on the right that looks amenable to curettage and irrigation.      Other orders  -     aspirin (Aspirin Low Dose) 81 MG EC tablet; Take 1 tablet by mouth Daily.  Dispense: 90 tablet; Refill: 1  -     Insulin Pen Needle (B-D UF III MINI PEN NEEDLES) 31G X 5 MM misc; 1 each by Other route Daily.  Dispense: 90 each; Refill: 1  -     glimepiride (AMARYL) 4 MG tablet; Take 2 tablets by mouth Every Morning Before Breakfast.  Dispense: 180 tablet; Refill: 1  -     Insulin Glargine (LANTUS SOLOSTAR) 100 UNIT/ML injection pen; Take 35 units every evening, increase by 5 units every 3 days until fasting sugars less than 150, max dose 70 units.  Dispense: 45 mL; Refill: 1  -     Jardiance 25 MG tablet tablet; Take 1 tablet by mouth Daily.  Dispense: 90 tablet; Refill: 1  -     lansoprazole (PREVACID) 30 MG capsule; Take 1 capsule by mouth Daily.  Dispense: 90 capsule; Refill: 1  -     lisinopril (PRINIVIL,ZESTRIL) 10 MG tablet; Take 1 tablet by mouth Daily.  Dispense: 90 tablet; Refill: 1  -     metFORMIN (GLUCOPHAGE) 1000 MG tablet; Take 1 tablet by mouth Every 12 (Twelve) Hours.  Dispense: 360 tablet; Refill:  1  -     metoprolol succinate XL (TOPROL-XL) 25 MG 24 hr tablet; Take 1 tablet by mouth Daily.  Dispense: 90 tablet; Refill: 1  -     rosuvastatin (CRESTOR) 40 MG tablet; Take 1 tablet by mouth Daily.  Dispense: 90 tablet; Refill: 1  -     Cancel: Hemoglobin A1c; Future            Class 2 Severe Obesity (BMI >=35 and <=39.9). Obesity-related health conditions include the following: coronary heart disease, diabetes mellitus, dyslipidemias, and osteoarthritis. Obesity is unchanged. BMI is is above average; BMI management plan is completed. We discussed portion control and increasing exercise.    Follow Up   Return in about 3 months (around 1/9/2025).  Patient was given instructions and counseling regarding his condition or for health maintenance advice. Please see specific information pulled into the AVS if appropriate.     Total Time Spent:   minutes     This time includes time spent by me in the following activities: preparing for the visit, reviewing extensive past medical history and tests, performing a medically appropriate examination and/or evaluation, counseling and educating the patient and/or caregivers, ordering medications, tests, or procedures, referring and/or communicating with other health care professionals and documenting information in the medical record all on this date of service.

## 2024-10-09 NOTE — ASSESSMENT & PLAN NOTE
GFR 59 is certainly stable as of his 10/24 OV.  His electrolytes are fine, no acidosis, has low-dose ACEI on board, stable to continue same.    Of note he does have some mild to moderate proteinuria, we will need to titrate his lisinopril dose as tolerates if this does not improve with control of his sugars.

## 2024-10-09 NOTE — ASSESSMENT & PLAN NOTE
Blood pressure stable and his pulse is in the 80s as of his 10/24 OV.  He is on low doses of lisinopril and metoprolol, stable to continue same.

## 2024-10-09 NOTE — ASSESSMENT & PLAN NOTE
Patient's A1c is 12.8 as of his 10/24 OV.  He is maxed out on metformin, Jardiance, and glimepiride.  His C-peptide is still in the normal range, so therapeutically he should be benefiting from the glimepiride.    He is only on 25 units of Lantus at night, discussed with patient he needs to start increasing this by 5 units every night until his morning sugars are in the 150 ballpark.  His fasting sugar was in the 290 ballpark.  Will need close follow-up of labs, if A1c remains elevated despite adequate fasting sugars, patient will need to be on mealtime insulin as well.

## 2024-10-09 NOTE — ASSESSMENT & PLAN NOTE
This is as of his 10/24 OV.  Been bothering him quite a bit this summer, thought it was possibly allergies.  He does have significant impaction on the right that looks amenable to curettage and irrigation.

## 2024-10-24 ENCOUNTER — OFFICE VISIT (OUTPATIENT)
Dept: ORTHOPEDIC SURGERY | Facility: CLINIC | Age: 59
End: 2024-10-24
Payer: COMMERCIAL

## 2024-10-24 VITALS
BODY MASS INDEX: 40.37 KG/M2 | DIASTOLIC BLOOD PRESSURE: 82 MMHG | SYSTOLIC BLOOD PRESSURE: 147 MMHG | OXYGEN SATURATION: 96 % | HEART RATE: 82 BPM | HEIGHT: 70 IN | WEIGHT: 282 LBS

## 2024-10-24 DIAGNOSIS — S83.242A TEAR OF MEDIAL MENISCUS OF LEFT KNEE, CURRENT, UNSPECIFIED TEAR TYPE, INITIAL ENCOUNTER: ICD-10-CM

## 2024-10-24 DIAGNOSIS — M25.562 LEFT KNEE PAIN, UNSPECIFIED CHRONICITY: Primary | ICD-10-CM

## 2024-10-24 NOTE — PROGRESS NOTES
"Chief Complaint  Pain and Initial Evaluation of the Left Knee       Subjective      Cisco Acevedo presents to [facility] for an evaluation  of his left knee. His left knee has been bothering him for several months. He locates his pain to the anterior  medial knee. His biggest pain and complaint is going up and down stairs. He reports no specific injury, trauma, falls or prior surgery. He reports occasional buckling to his knee. He denies clicking or popping to his left knee.     Allergies   Allergen Reactions    Semaglutide Nausea And Vomiting     Pancreatitis.    Codeine Unknown (See Comments)     'Feels ill'        Social History     Socioeconomic History    Marital status:    Tobacco Use    Smoking status: Never     Passive exposure: Never    Smokeless tobacco: Never   Vaping Use    Vaping status: Never Used   Substance and Sexual Activity    Alcohol use: Yes     Comment: occ    Drug use: Never    Sexual activity: Yes        I reviewed the patient's chief complaint, history of present illness, review of systems, past medical history, surgical history, family history, social history, medications, and allergy list.     Review of Systems     Constitutional: Denies fevers, chills, weight loss  Cardiovascular: Denies chest pain, shortness of breath  Skin: Denies rashes, acute skin changes  Neurologic: Denies headache, loss of consciousness  MSK: left knee pain       Vital Signs:   /82   Pulse 82   Ht 177.8 cm (70\")   Wt 128 kg (282 lb)   SpO2 96%   BMI 40.46 kg/m²            Ortho Exam    Physical Exam  General:Alert. No acute distress     Left lower extremity: tender to the medial joint line , non tender to the lateral joint line, full extension, flexion to 130 degrees, stable to varus/valgus stress, stable to anterior/posterior drawer, negative  Lachman's, mild pain with Fan's, calf soft , distal neurovascularly intact, positive EHL, FHL, GS, and TA. Sensation " intact to all 5 nerves of the foot.     Procedures    X-Ray Report:  Left knee X-Ray  Indication: Evaluation of left knee pain   AP/Lateral and Standing view(s)  Findings: no significant degenerative changes, no acute fracture, no effusion   Prior studies available for comparison: no       Imaging Results (Most Recent)       Procedure Component Value Units Date/Time    XR Knee 3 View Left [[id number]] Resulted: 10/24/24 1548     Updated: 10/24/24 1551             Result Review :       No results found.           Assessment and Plan     Diagnoses and all orders for this visit:    1. Left knee pain, unspecified chronicity (Primary)  -     XR Knee 3 View Left    2. Tear of medial meniscus of left knee, current, unspecified tear type, initial encounter      The patient presents here today for an evaluation  of his left knee. X-rays were obtained in the office today and these were reviewed today.     MRI order placed today to evaluate his meniscus and internal derangement.       Home exercises given today and will continue over the counter medications for pain control.     Call or return if worsening symptoms.    Follow Up     After MRI     Patient was given instructions and counseling regarding his condition or for health maintenance advice. Please see specific information pulled into the AVS if appropriate.     Scribed for Irwin Bhandari MD by Marion Guajardo.  10/24/24   15:56 EDT    I have personally performed the services described in this document as scribed by the above individual and it is both accurate and complete. Irwin Bhandari MD 10/24/24

## 2024-10-29 ENCOUNTER — TELEPHONE (OUTPATIENT)
Dept: ORTHOPEDIC SURGERY | Facility: CLINIC | Age: 59
End: 2024-10-29
Payer: COMMERCIAL

## 2024-10-29 NOTE — TELEPHONE ENCOUNTER
LEFT MESSAGE MRI AT [facility] 11-7-2024 ARRIVE AT 8:00 AM FOR 8:15 AM SCAN FOLLOW UP WITH HUBERT ON 11- AT 8:30 AM

## 2024-11-14 ENCOUNTER — TELEPHONE (OUTPATIENT)
Dept: ORTHOPEDIC SURGERY | Facility: CLINIC | Age: 59
End: 2024-11-14

## 2024-11-14 NOTE — TELEPHONE ENCOUNTER
MRI WAS ORDERED FOR LEFT KNEE.     PATIENT IS SCHEDULED FOR MRI OF LEFT KNEE ON SATURDAY 11/16/2024 AT [facility].     ORDER THAT WAS PLACED ON 10/24/2024 IS FOR THE RIGHT KNEE. CAN YOU PLEASE PLACE A NEW MRI ORDER FOR THE LEFT KNEE?     THANKS!   ANGEL LUIS

## 2024-11-22 ENCOUNTER — OFFICE VISIT (OUTPATIENT)
Dept: ORTHOPEDIC SURGERY | Facility: CLINIC | Age: 59
End: 2024-11-22
Payer: COMMERCIAL

## 2024-11-22 VITALS
OXYGEN SATURATION: 95 % | SYSTOLIC BLOOD PRESSURE: 133 MMHG | BODY MASS INDEX: 40.37 KG/M2 | HEIGHT: 70 IN | DIASTOLIC BLOOD PRESSURE: 81 MMHG | WEIGHT: 282 LBS | HEART RATE: 75 BPM

## 2024-11-22 DIAGNOSIS — M17.12 PRIMARY OSTEOARTHRITIS OF LEFT KNEE: ICD-10-CM

## 2024-11-22 DIAGNOSIS — E66.01 OBESITY, MORBID, BMI 40.0-49.9: Primary | ICD-10-CM

## 2024-11-22 RX ORDER — DICLOFENAC SODIUM 75 MG/1
75 TABLET, DELAYED RELEASE ORAL 2 TIMES DAILY
Qty: 60 TABLET | Refills: 2 | Status: SHIPPED | OUTPATIENT
Start: 2024-11-22

## 2024-11-22 NOTE — PROGRESS NOTES
"Chief Complaint  Follow-up of the Left Knee       Subjective      Cisco Acevedo presents to [facility] for a follow up for his left knee. He was last seen in the office on 10/24/24 where we ordered an MRI on his left knee. He presents to the office today for these results. To review, his left knee has been bothering him for several months. He locates his pain to the anterior medial knee. His biggest pain and complaint is going up and down stairs. He reports no specific injury, trauma, falls or prior surgery. He reports occasional buckling to his knee. He denies clicking or popping to his left knee.     Allergies   Allergen Reactions    Semaglutide Nausea And Vomiting     Pancreatitis.    Codeine Unknown (See Comments)     'Feels ill'        Social History     Socioeconomic History    Marital status:    Tobacco Use    Smoking status: Never     Passive exposure: Never    Smokeless tobacco: Never   Vaping Use    Vaping status: Never Used   Substance and Sexual Activity    Alcohol use: Yes     Comment: occ    Drug use: Never    Sexual activity: Yes        I reviewed the patient's chief complaint, history of present illness, review of systems, past medical history, surgical history, family history, social history, medications, and allergy list.     Review of Systems     Constitutional: Denies fevers, chills, weight loss  Cardiovascular: Denies chest pain, shortness of breath  Skin: Denies rashes, acute skin changes  Neurologic: Denies headache, loss of consciousness  MSK: Left knee pain       Vital Signs:   /81   Pulse 75   Ht 177.8 cm (70\")   Wt 128 kg (282 lb)   SpO2 95%   BMI 40.46 kg/m²            Ortho Exam    Physical Exam  General:Alert. No acute distress     Left lower extremity: tender to the medial joint line , non tender to the lateral joint line, full extension, flexion to 130 degrees, stable to varus/valgus stress, stable to anterior/posterior drawer, negative  " Lachman's, mild pain with Fan's, calf soft , distal neurovascularly intact, positive EHL, FHL, GS, and TA. Sensation intact to all 5 nerves of the foot.     Procedures    [facility]   Left knee MRI performed on 11/16/24   Impression   Thickening of the anterior  cruciate ligament without discontinuity. This is likely related to mucoid degenerative changes, posterior  cruciate ligament is unremarkable.   Mild cartilage disease     Imaging Results (Most Recent)       None             Result Review :       XR Knee 3 View Left    Result Date: 10/28/2024  Narrative: X-Ray Report: Left knee X-Ray Indication: Evaluation of left knee pain AP/Lateral and Standing view(s) Findings: no significant degenerative changes, no acute fracture, no effusion Prior studies available for comparison: no             Assessment and Plan     Diagnoses and all orders for this visit:    1. Obesity, morbid, BMI 40.0-49.9 (Primary)        The patient presents here today for a follow up for his left knee. MRI results were discussed and reviewed with the patient today in the office.     Discussed the risks and benefits of a left knee steroid injection today in the office. Patient expressed understanding and wishes to hold off at this time. He will call back if he wishes to proceed.     Home exercises given today and diclofenac sent into the pharmacy today.      Educated on risk of elevated BMI.  Discussed options for weight loss/decreasing BMI prior to procedure including dietician consult, weight loss options and exercise program. and Call or return if worsening symptoms.    Follow Up     6 - 8 weeks     Patient was given instructions and counseling regarding his condition or for health maintenance advice. Please see specific information pulled into the AVS if appropriate.     Scribed for Irwin Bhandari MD by Marion Guajardo.  11/22/24   09:20 EST    I have personally performed the services described in this document as scribed by  the above individual and it is both accurate and complete. Irwin Bhandari MD 11/25/24

## 2024-12-22 DIAGNOSIS — E11.65 TYPE 2 DIABETES MELLITUS WITH HYPERGLYCEMIA, WITHOUT LONG-TERM CURRENT USE OF INSULIN: Primary | ICD-10-CM

## 2024-12-23 RX ORDER — INSULIN GLARGINE 100 [IU]/ML
INJECTION, SOLUTION SUBCUTANEOUS
Qty: 45 ML | Refills: 4 | Status: SHIPPED | OUTPATIENT
Start: 2024-12-23

## 2025-02-08 ENCOUNTER — LAB (OUTPATIENT)
Facility: HOSPITAL | Age: 60
End: 2025-02-08
Payer: COMMERCIAL

## 2025-02-08 DIAGNOSIS — E78.2 MIXED HYPERLIPIDEMIA: ICD-10-CM

## 2025-02-08 DIAGNOSIS — E11.65 TYPE 2 DIABETES MELLITUS WITH HYPERGLYCEMIA, WITHOUT LONG-TERM CURRENT USE OF INSULIN: ICD-10-CM

## 2025-02-08 DIAGNOSIS — N18.31 STAGE 3A CHRONIC KIDNEY DISEASE: ICD-10-CM

## 2025-02-08 LAB
CHOLEST SERPL-MCNC: 133 MG/DL (ref 0–200)
HBA1C MFR BLD: 10.4 % (ref 4.8–5.6)
HDLC SERPL-MCNC: 42 MG/DL (ref 40–60)
LDLC SERPL CALC-MCNC: 64 MG/DL (ref 0–100)
LDLC/HDLC SERPL: 1.42 {RATIO}
TRIGL SERPL-MCNC: 157 MG/DL (ref 0–150)
VLDLC SERPL-MCNC: 27 MG/DL (ref 5–40)

## 2025-02-08 PROCEDURE — 36415 COLL VENOUS BLD VENIPUNCTURE: CPT

## 2025-02-08 PROCEDURE — 80061 LIPID PANEL: CPT

## 2025-02-08 PROCEDURE — 83036 HEMOGLOBIN GLYCOSYLATED A1C: CPT

## 2025-02-10 ENCOUNTER — OFFICE VISIT (OUTPATIENT)
Age: 60
End: 2025-02-10
Payer: COMMERCIAL

## 2025-02-10 VITALS
HEIGHT: 70 IN | OXYGEN SATURATION: 96 % | HEART RATE: 74 BPM | WEIGHT: 287.2 LBS | SYSTOLIC BLOOD PRESSURE: 126 MMHG | TEMPERATURE: 98 F | DIASTOLIC BLOOD PRESSURE: 78 MMHG | BODY MASS INDEX: 41.12 KG/M2

## 2025-02-10 DIAGNOSIS — M54.2 NECK PAIN: ICD-10-CM

## 2025-02-10 DIAGNOSIS — J30.1 NON-SEASONAL ALLERGIC RHINITIS DUE TO POLLEN: ICD-10-CM

## 2025-02-10 DIAGNOSIS — I10 PRIMARY HYPERTENSION: ICD-10-CM

## 2025-02-10 DIAGNOSIS — E55.9 VITAMIN D DEFICIENCY: ICD-10-CM

## 2025-02-10 DIAGNOSIS — H44.003 EYE INFECTION, BILATERAL: ICD-10-CM

## 2025-02-10 DIAGNOSIS — N18.31 STAGE 3A CHRONIC KIDNEY DISEASE: ICD-10-CM

## 2025-02-10 DIAGNOSIS — E11.65 TYPE 2 DIABETES MELLITUS WITH HYPERGLYCEMIA, WITHOUT LONG-TERM CURRENT USE OF INSULIN: Primary | ICD-10-CM

## 2025-02-10 DIAGNOSIS — E78.2 MIXED HYPERLIPIDEMIA: ICD-10-CM

## 2025-02-10 PROBLEM — H61.21 IMPACTED CERUMEN OF RIGHT EAR: Status: RESOLVED | Noted: 2024-10-09 | Resolved: 2025-02-10

## 2025-02-10 PROCEDURE — 99214 OFFICE O/P EST MOD 30 MIN: CPT | Performed by: INTERNAL MEDICINE

## 2025-02-10 RX ORDER — VALACYCLOVIR HYDROCHLORIDE 500 MG/1
500 TABLET, FILM COATED ORAL 2 TIMES DAILY PRN
Qty: 30 TABLET | Refills: 1 | Status: SHIPPED | OUTPATIENT
Start: 2025-02-10

## 2025-02-10 RX ORDER — LANCETS 30 GAUGE
1 EACH MISCELLANEOUS DAILY
Qty: 90 EACH | Refills: 1 | Status: SHIPPED | OUTPATIENT
Start: 2025-02-10

## 2025-02-10 RX ORDER — LISINOPRIL 10 MG/1
10 TABLET ORAL DAILY
Qty: 90 TABLET | Refills: 1 | Status: SHIPPED | OUTPATIENT
Start: 2025-02-10

## 2025-02-10 RX ORDER — EMPAGLIFLOZIN 25 MG/1
25 TABLET, FILM COATED ORAL DAILY
Qty: 90 TABLET | Refills: 1 | Status: SHIPPED | OUTPATIENT
Start: 2025-02-10

## 2025-02-10 RX ORDER — METOPROLOL SUCCINATE 25 MG/1
25 TABLET, EXTENDED RELEASE ORAL DAILY
Qty: 90 TABLET | Refills: 1 | Status: SHIPPED | OUTPATIENT
Start: 2025-02-10

## 2025-02-10 RX ORDER — MONTELUKAST SODIUM 10 MG/1
10 TABLET ORAL NIGHTLY
Qty: 90 TABLET | Refills: 1 | Status: SHIPPED | OUTPATIENT
Start: 2025-02-10

## 2025-02-10 RX ORDER — ATOVAQUONE AND PROGUANIL HYDROCHLORIDE 250; 100 MG/1; MG/1
TABLET, FILM COATED ORAL
Qty: 25 TABLET | Refills: 0 | Status: SHIPPED | OUTPATIENT
Start: 2025-02-10

## 2025-02-10 RX ORDER — GLIMEPIRIDE 4 MG/1
8 TABLET ORAL
Qty: 180 TABLET | Refills: 1 | Status: SHIPPED | OUTPATIENT
Start: 2025-02-10

## 2025-02-10 RX ORDER — ROSUVASTATIN CALCIUM 40 MG/1
40 TABLET, COATED ORAL DAILY
Qty: 90 TABLET | Refills: 1 | Status: SHIPPED | OUTPATIENT
Start: 2025-02-10

## 2025-02-10 RX ORDER — INSULIN GLARGINE 100 [IU]/ML
77 INJECTION, SOLUTION SUBCUTANEOUS NIGHTLY
Qty: 75 ML | Refills: 1 | Status: SHIPPED | OUTPATIENT
Start: 2025-02-10 | End: 2025-08-24

## 2025-02-10 RX ORDER — ASPIRIN 81 MG/1
81 TABLET ORAL DAILY
Qty: 90 TABLET | Refills: 1 | Status: SHIPPED | OUTPATIENT
Start: 2025-02-10

## 2025-02-10 RX ORDER — LANSOPRAZOLE 30 MG/1
30 CAPSULE, DELAYED RELEASE ORAL DAILY
Qty: 90 CAPSULE | Refills: 1 | Status: SHIPPED | OUTPATIENT
Start: 2025-02-10

## 2025-02-10 NOTE — ASSESSMENT & PLAN NOTE
Patient having persistent issues with this as of 2/25 OV despite utilizing over-the-counter antihistamine.  We will add Singulair at this time.

## 2025-02-10 NOTE — ASSESSMENT & PLAN NOTE
Blood pressure joellen well-controlled and his pulse in the mid 70s as of his 2/25 OV.  He is on low doses of lisinopril and metoprolol, stable to continue same.

## 2025-02-10 NOTE — PROGRESS NOTES
Chief Complaint  Diabetes, Follow-up (Pt had labs, he states that this is routine./[place] is going out [place] the [place] and he is wanting to know if you would give him the anti malaria medication), Left side of neck pain (Pt states that the left side of his neck is bothering him, it is localized and when turning to the left it clicks. ), and Allergies (Pt is taking Zrytec and he states that they are driving him crazy, he recently moved here a year ago.)    Subjective      Cisco Acevedo presents to [facility]    Diabetes  Pertinent negatives for hypoglycemia include no confusion. Pertinent negatives for diabetes include no blurred vision, no chest pain and no fatigue.   Allergies  Pertinent negatives include no abdominal pain, arthralgias, chest pain, congestion, coughing, fatigue or fever.   Primary Care Follow-UpPertinent negatives include no chest pain, no confusion, no palpitations, no shortness of breath, no fatigue, no blurred vision, no wheezing, no abdominal pain, no cough and no depressed mood.   Pain  Pertinent negatives include no abdominal pain, arthralgias, chest pain, congestion, coughing, fatigue or fever.     History of present illness:  Patient is a 59-year-old male with underlying DM, HTN, HLD, resultant CAD, morbid obesity, among others, seen in 1/24 as a New Patient, and who is coming back now 2/25 for routine 3-4-month follow-up.  We will review his med list, go over any recent labs available, address his care gaps, and make further recommendations at that time.    Review of Systems   Constitutional:  Negative for appetite change, fatigue and fever.   HENT:  Negative for congestion and ear pain.    Eyes:  Negative for blurred vision.   Respiratory:  Negative for cough, chest tightness, shortness of breath and wheezing.    Cardiovascular:  Negative for chest pain, palpitations and leg swelling.   Gastrointestinal:  Negative for abdominal pain.   Genitourinary:  " Negative for difficulty urinating, dysuria and hematuria.   Musculoskeletal:  Negative for arthralgias and gait problem.   Skin:  Negative for skin lesions.   Neurological:  Negative for syncope, memory problem and confusion.   Psychiatric/Behavioral:  Negative for self-injury and depressed mood.        Objective   Vital Signs:   /78   Pulse 74   Temp 98 °F (36.7 °C) (Oral)   Ht 177.8 cm (70\")   Wt 130 kg (287 lb 3.2 oz)   SpO2 96%   BMI 41.21 kg/m²         Physical Exam  Vitals and nursing note reviewed.   Constitutional:       General: He is not in acute distress.     Appearance: Normal appearance. He is not toxic-appearing.   HENT:      Head: Atraumatic.      Right Ear: External ear normal.      Left Ear: External ear normal.      Nose: Nose normal.      Mouth/Throat:      Mouth: Mucous membranes are moist.   Eyes:      General:         Right eye: No discharge.         Left eye: No discharge.      Extraocular Movements: Extraocular movements intact.      Pupils: Pupils are equal, round, and reactive to light.   Neck:      Comments: No carotid bruit.  Cardiovascular:      Rate and Rhythm: Normal rate and regular rhythm.      Pulses: Normal pulses.      Heart sounds: Normal heart sounds. No murmur heard.     No gallop.      Comments: Heart tones normal, no ectopy, no S3.  Pulmonary:      Effort: Pulmonary effort is normal. No respiratory distress.      Breath sounds: No wheezing, rhonchi or rales.      Comments: Lung fields clear bilaterally.  Abdominal:      General: There is no distension.      Palpations: Abdomen is soft. There is no mass.      Tenderness: There is no abdominal tenderness. There is no guarding.      Comments: Positive liver edge.   Musculoskeletal:         General: No swelling or tenderness.      Cervical back: No tenderness.      Right lower leg: No edema.      Left lower leg: No edema.      Comments: Trace pretibial edema.   Skin:     General: Skin is warm and dry.      Findings: " No rash.   Neurological:      General: No focal deficit present.      Mental Status: He is alert and oriented to person, place, and time. Mental status is at baseline.      Motor: No weakness.      Gait: Gait normal.   Psychiatric:         Mood and Affect: Mood normal.         Thought Content: Thought content normal.          Result Review   The following data was reviewed by: Bakari Coppola MD on 01/23/2024:  [x] Laboratory  [] Microbiology  [] Radiology  [] EKG/telemetry  [] Cardiology/Vascular  [] Pathology  [x] Old records             Assessment and Plan   Diagnoses and all orders for this visit:    1. Type 2 diabetes mellitus with hyperglycemia, without long-term current use of insulin (Primary)  Overview:  Patient diagnosed as diabetic at least in his mid 30s, was initially on insulin, and transition to oral agents.    Patient failed semaglutide secondary to episode of pancreatitis.    C-peptide is 2.8.    Assessment & Plan:  Patient's A1c is down from 12.8 to 10.4 as of his 2/25 OV.  He is maxed out on metformin, Jardiance, and glimepiride.  He has been titrating his Lantus insulin, he was on 25 units when we saw him last, he is presently on 77 units.  Did not get a fasting sugar with his current labs, just have the A1c, but he says here in the past few weeks he is getting sugars around 150 in the morning.    Recommend continue current plan of care, get A1c this spring and make further recommendations.    Orders:  -     Insulin Glargine (Lantus SoloStar) 100 UNIT/ML injection pen; Inject 77 Units under the skin into the appropriate area as directed Every Night for 195 days.  Dispense: 75 mL; Refill: 1  -     Hemoglobin A1c; Future    2. Eye infection, bilateral  -     valACYclovir (VALTREX) 500 MG tablet; Take 1 tablet by mouth 2 (Two) Times a Day As Needed (Outbreak.).  Dispense: 30 tablet; Refill: 1    3. Primary hypertension  Assessment & Plan:  Blood pressure joellen well-controlled and his pulse in the mid  70s as of his 2/25 OV.  He is on low doses of lisinopril and metoprolol, stable to continue same.      4. Mixed hyperlipidemia  Assessment & Plan:  LDL is initially down from 170 to 40, he has rebounded just a little to 60 as of 2/25 OV, but this is certainly still at goal.  He is on full dose Crestor and should continue same.    Orders:  -     Comprehensive metabolic panel; Future  -     Lipid panel; Future    5. Stage 3a chronic kidney disease  Assessment & Plan:  Patient CMP was not obtained as requested, and labs were just drawn 2 days ago, but unable to add it for unknown reason.    Repeat on return to office this spring.    Orders:  -     CBC w AUTO Differential; Future    6. Vitamin D deficiency  -     Vitamin D 25 hydroxy; Future    7. Non-seasonal allergic rhinitis due to pollen  Assessment & Plan:  Patient having persistent issues with this as of 2/25 OV despite utilizing over-the-counter antihistamine.  We will add Singulair at this time.      8. Neck pain  -     XR Spine Cervical 2 or 3 View; Future    Other orders  -     aspirin (Aspirin Low Dose) 81 MG EC tablet; Take 1 tablet by mouth Daily.  Dispense: 90 tablet; Refill: 1  -     glimepiride (AMARYL) 4 MG tablet; Take 2 tablets by mouth Every Morning Before Breakfast.  Dispense: 180 tablet; Refill: 1  -     Jardiance 25 MG tablet tablet; Take 1 tablet by mouth Daily.  Dispense: 90 tablet; Refill: 1  -     lansoprazole (PREVACID) 30 MG capsule; Take 1 capsule by mouth Daily.  Dispense: 90 capsule; Refill: 1  -     lisinopril (PRINIVIL,ZESTRIL) 10 MG tablet; Take 1 tablet by mouth Daily.  Dispense: 90 tablet; Refill: 1  -     metFORMIN (GLUCOPHAGE) 1000 MG tablet; Take 1 tablet by mouth Every 12 (Twelve) Hours.  Dispense: 360 tablet; Refill: 1  -     metoprolol succinate XL (TOPROL-XL) 25 MG 24 hr tablet; Take 1 tablet by mouth Daily.  Dispense: 90 tablet; Refill: 1  -     rosuvastatin (CRESTOR) 40 MG tablet; Take 1 tablet by mouth Daily.  Dispense: 90  tablet; Refill: 1  -     montelukast (Singulair) 10 MG tablet; Take 1 tablet by mouth Every Night.  Dispense: 90 tablet; Refill: 1  -     atovaquone-proguanil (MALARONE) 250-100 MG tablet per tablet; Take 1 tablet daily 2 days prior to trip, every day during your trip, and once a day for a week after returning.  Dispense: 25 tablet; Refill: 0            Class 2 Severe Obesity (BMI >=35 and <=39.9). Obesity-related health conditions include the following: coronary heart disease, diabetes mellitus, dyslipidemias, and osteoarthritis. Obesity is unchanged. BMI is is above average; BMI management plan is completed. We discussed portion control and increasing exercise.    Follow Up   Return in about 3 months (around 5/10/2025).  Patient was given instructions and counseling regarding his condition or for health maintenance advice. Please see specific information pulled into the AVS if appropriate.     Total Time Spent:   minutes     This time includes time spent by me in the following activities: preparing for the visit, reviewing extensive past medical history and tests, performing a medically appropriate examination and/or evaluation, counseling and educating the patient and/or caregivers, ordering medications, tests, or procedures, referring and/or communicating with other health care professionals and documenting information in the medical record all on this date of service.

## 2025-02-10 NOTE — ASSESSMENT & PLAN NOTE
LDL is initially down from 170 to 40, he has rebounded just a little to 60 as of 2/25 OV, but this is certainly still at goal.  He is on full dose Crestor and should continue same.

## 2025-02-10 NOTE — ASSESSMENT & PLAN NOTE
Patient's A1c is down from 12.8 to 10.4 as of his 2/25 OV.  He is maxed out on metformin, Jardiance, and glimepiride.  He has been titrating his Lantus insulin, he was on 25 units when we saw him last, he is presently on 77 units.  Did not get a fasting sugar with his current labs, just have the A1c, but he says here in the past few weeks he is getting sugars around 150 in the morning.    Recommend continue current plan of care, get A1c this spring and make further recommendations.

## 2025-02-18 ENCOUNTER — TELEPHONE (OUTPATIENT)
Age: 60
End: 2025-02-18

## 2025-02-18 DIAGNOSIS — E11.65 TYPE 2 DIABETES MELLITUS WITH HYPERGLYCEMIA, WITHOUT LONG-TERM CURRENT USE OF INSULIN: ICD-10-CM

## 2025-02-18 NOTE — TELEPHONE ENCOUNTER
Caller: [email or web address]    Relationship: Self    Best call back number: [phone]    Requested Prescriptions:   Requested Prescriptions     Pending Prescriptions Disp Refills    glucose blood test strip 90 each 1     Si each by Other route Daily. Use as instructed        Pharmacy where request should be sent: [facility] - [facility] [address] [place] [address] [phone] [facility] [phone]      Last office visit with prescribing clinician: 2/10/2025   Last telemedicine visit with prescribing clinician: Visit date not found   Next office visit with prescribing clinician: 2025     Additional details provided by patient: THIS WAS SENT TO [facility]. HOWEVER, IT NEEDS TO BE SENT TO [facility] DUE TO COST.     Does the patient have less than a 3 day supply:  [x] Yes  [] No    Would you like a call back once the refill request has been completed: [x] Yes [] No    If the office needs to give you a call back, can they leave a voicemail: [x] Yes [] No    Stan Nguyen Rep   25 12:41 EST

## 2025-06-14 ENCOUNTER — LAB (OUTPATIENT)
Facility: HOSPITAL | Age: 60
End: 2025-06-14
Payer: COMMERCIAL

## 2025-06-14 DIAGNOSIS — E11.65 TYPE 2 DIABETES MELLITUS WITH HYPERGLYCEMIA, WITHOUT LONG-TERM CURRENT USE OF INSULIN: ICD-10-CM

## 2025-06-14 DIAGNOSIS — E55.9 VITAMIN D DEFICIENCY: ICD-10-CM

## 2025-06-14 DIAGNOSIS — N18.31 STAGE 3A CHRONIC KIDNEY DISEASE: ICD-10-CM

## 2025-06-14 DIAGNOSIS — E78.2 MIXED HYPERLIPIDEMIA: ICD-10-CM

## 2025-06-14 LAB
25(OH)D3 SERPL-MCNC: 33.8 NG/ML (ref 30–100)
ALBUMIN SERPL-MCNC: 4 G/DL (ref 3.5–5.2)
ALBUMIN/GLOB SERPL: 1.3 G/DL
ALP SERPL-CCNC: 100 U/L (ref 39–117)
ALT SERPL W P-5'-P-CCNC: 25 U/L (ref 1–41)
ANION GAP SERPL CALCULATED.3IONS-SCNC: 7.4 MMOL/L (ref 5–15)
AST SERPL-CCNC: 27 U/L (ref 1–40)
BASOPHILS # BLD AUTO: 0.05 10*3/MM3 (ref 0–0.2)
BASOPHILS NFR BLD AUTO: 0.7 % (ref 0–1.5)
BILIRUB SERPL-MCNC: 1.4 MG/DL (ref 0–1.2)
BUN SERPL-MCNC: 26 MG/DL (ref 6–20)
BUN/CREAT SERPL: 16.3 (ref 7–25)
CALCIUM SPEC-SCNC: 9.6 MG/DL (ref 8.6–10.5)
CHLORIDE SERPL-SCNC: 106 MMOL/L (ref 98–107)
CHOLEST SERPL-MCNC: 121 MG/DL (ref 0–200)
CO2 SERPL-SCNC: 26.6 MMOL/L (ref 22–29)
CREAT SERPL-MCNC: 1.6 MG/DL (ref 0.76–1.27)
DEPRECATED RDW RBC AUTO: 42.1 FL (ref 37–54)
EGFRCR SERPLBLD CKD-EPI 2021: 49.6 ML/MIN/1.73
EOSINOPHIL # BLD AUTO: 0.22 10*3/MM3 (ref 0–0.4)
EOSINOPHIL NFR BLD AUTO: 3.2 % (ref 0.3–6.2)
ERYTHROCYTE [DISTWIDTH] IN BLOOD BY AUTOMATED COUNT: 13.9 % (ref 12.3–15.4)
GLOBULIN UR ELPH-MCNC: 3.1 GM/DL
GLUCOSE SERPL-MCNC: 111 MG/DL (ref 65–99)
HBA1C MFR BLD: 9.9 % (ref 4.8–5.6)
HCT VFR BLD AUTO: 49.6 % (ref 37.5–51)
HDLC SERPL-MCNC: 38 MG/DL (ref 40–60)
HGB BLD-MCNC: 16.2 G/DL (ref 13–17.7)
IMM GRANULOCYTES # BLD AUTO: 0.04 10*3/MM3 (ref 0–0.05)
IMM GRANULOCYTES NFR BLD AUTO: 0.6 % (ref 0–0.5)
LDLC SERPL CALC-MCNC: 64 MG/DL (ref 0–100)
LDLC/HDLC SERPL: 1.64 {RATIO}
LYMPHOCYTES # BLD AUTO: 1.95 10*3/MM3 (ref 0.7–3.1)
LYMPHOCYTES NFR BLD AUTO: 28.6 % (ref 19.6–45.3)
MCH RBC QN AUTO: 27.2 PG (ref 26.6–33)
MCHC RBC AUTO-ENTMCNC: 32.7 G/DL (ref 31.5–35.7)
MCV RBC AUTO: 83.4 FL (ref 79–97)
MONOCYTES # BLD AUTO: 0.65 10*3/MM3 (ref 0.1–0.9)
MONOCYTES NFR BLD AUTO: 9.5 % (ref 5–12)
NEUTROPHILS NFR BLD AUTO: 3.92 10*3/MM3 (ref 1.7–7)
NEUTROPHILS NFR BLD AUTO: 57.4 % (ref 42.7–76)
NRBC BLD AUTO-RTO: 0 /100 WBC (ref 0–0.2)
PLATELET # BLD AUTO: 221 10*3/MM3 (ref 140–450)
PMV BLD AUTO: 9.8 FL (ref 6–12)
POTASSIUM SERPL-SCNC: 5 MMOL/L (ref 3.5–5.2)
PROT SERPL-MCNC: 7.1 G/DL (ref 6–8.5)
RBC # BLD AUTO: 5.95 10*6/MM3 (ref 4.14–5.8)
SODIUM SERPL-SCNC: 140 MMOL/L (ref 136–145)
TRIGL SERPL-MCNC: 104 MG/DL (ref 0–150)
VLDLC SERPL-MCNC: 19 MG/DL (ref 5–40)
WBC NRBC COR # BLD AUTO: 6.83 10*3/MM3 (ref 3.4–10.8)

## 2025-06-14 PROCEDURE — 82306 VITAMIN D 25 HYDROXY: CPT

## 2025-06-14 PROCEDURE — 83036 HEMOGLOBIN GLYCOSYLATED A1C: CPT

## 2025-06-14 PROCEDURE — 80061 LIPID PANEL: CPT

## 2025-06-14 PROCEDURE — 85025 COMPLETE CBC W/AUTO DIFF WBC: CPT

## 2025-06-14 PROCEDURE — 36415 COLL VENOUS BLD VENIPUNCTURE: CPT

## 2025-06-14 PROCEDURE — 80053 COMPREHEN METABOLIC PANEL: CPT

## 2025-06-17 ENCOUNTER — RESULTS FOLLOW-UP (OUTPATIENT)
Facility: HOSPITAL | Age: 60
End: 2025-06-17
Payer: COMMERCIAL

## 2025-06-17 DIAGNOSIS — E11.65 TYPE 2 DIABETES MELLITUS WITH HYPERGLYCEMIA, WITHOUT LONG-TERM CURRENT USE OF INSULIN: Primary | ICD-10-CM

## 2025-07-24 ENCOUNTER — TELEPHONE (OUTPATIENT)
Age: 60
End: 2025-07-24

## 2025-07-25 ENCOUNTER — TELEPHONE (OUTPATIENT)
Age: 60
End: 2025-07-25

## 2025-07-25 DIAGNOSIS — Z00.00 WELL ADULT HEALTH CHECK: ICD-10-CM

## 2025-07-25 DIAGNOSIS — I10 PRIMARY HYPERTENSION: ICD-10-CM

## 2025-07-25 DIAGNOSIS — Z12.5 PROSTATE CANCER SCREENING: Primary | ICD-10-CM

## 2025-07-25 DIAGNOSIS — E11.65 TYPE 2 DIABETES MELLITUS WITH HYPERGLYCEMIA, WITHOUT LONG-TERM CURRENT USE OF INSULIN: ICD-10-CM

## 2025-07-25 DIAGNOSIS — I10 PRIMARY HYPERTENSION: Primary | ICD-10-CM

## 2025-08-11 RX ORDER — MONTELUKAST SODIUM 10 MG/1
10 TABLET ORAL NIGHTLY
Qty: 90 TABLET | Refills: 3 | Status: SHIPPED | OUTPATIENT
Start: 2025-08-11

## 2025-08-16 DIAGNOSIS — E11.65 TYPE 2 DIABETES MELLITUS WITH HYPERGLYCEMIA, WITHOUT LONG-TERM CURRENT USE OF INSULIN: ICD-10-CM

## 2025-08-18 RX ORDER — INSULIN GLARGINE 100 [IU]/ML
INJECTION, SOLUTION SUBCUTANEOUS
Qty: 225 ML | Refills: 0 | Status: SHIPPED | OUTPATIENT
Start: 2025-08-18